# Patient Record
Sex: FEMALE | Race: WHITE | URBAN - METROPOLITAN AREA
[De-identification: names, ages, dates, MRNs, and addresses within clinical notes are randomized per-mention and may not be internally consistent; named-entity substitution may affect disease eponyms.]

---

## 2017-06-16 ENCOUNTER — OFFICE VISIT (OUTPATIENT)
Dept: FAMILY MEDICINE | Facility: CLINIC | Age: 27
End: 2017-06-16
Payer: COMMERCIAL

## 2017-06-16 ENCOUNTER — RESULT FOLLOW UP (OUTPATIENT)
Dept: FAMILY MEDICINE | Facility: CLINIC | Age: 27
End: 2017-06-16

## 2017-06-16 ENCOUNTER — TELEPHONE (OUTPATIENT)
Dept: FAMILY MEDICINE | Facility: CLINIC | Age: 27
End: 2017-06-16

## 2017-06-16 VITALS
WEIGHT: 128 LBS | BODY MASS INDEX: 17.92 KG/M2 | HEIGHT: 71 IN | DIASTOLIC BLOOD PRESSURE: 80 MMHG | TEMPERATURE: 98.8 F | SYSTOLIC BLOOD PRESSURE: 122 MMHG | RESPIRATION RATE: 16 BRPM | HEART RATE: 76 BPM

## 2017-06-16 DIAGNOSIS — A60.04 HERPES SIMPLEX VULVOVAGINITIS: ICD-10-CM

## 2017-06-16 DIAGNOSIS — N94.6 DYSMENORRHEA: ICD-10-CM

## 2017-06-16 DIAGNOSIS — R87.619 ABNORMAL CERVICAL PAPANICOLAOU SMEAR, UNSPECIFIED ABNORMAL PAP FINDING: ICD-10-CM

## 2017-06-16 DIAGNOSIS — Z12.4 SCREENING FOR MALIGNANT NEOPLASM OF CERVIX: ICD-10-CM

## 2017-06-16 DIAGNOSIS — Z00.00 ROUTINE GENERAL MEDICAL EXAMINATION AT A HEALTH CARE FACILITY: Primary | ICD-10-CM

## 2017-06-16 LAB — GLUCOSE SERPL-MCNC: 94 MG/DL (ref 70–99)

## 2017-06-16 PROCEDURE — 87491 CHLMYD TRACH DNA AMP PROBE: CPT | Performed by: FAMILY MEDICINE

## 2017-06-16 PROCEDURE — 80061 LIPID PANEL: CPT | Performed by: FAMILY MEDICINE

## 2017-06-16 PROCEDURE — 82947 ASSAY GLUCOSE BLOOD QUANT: CPT | Performed by: FAMILY MEDICINE

## 2017-06-16 PROCEDURE — 87624 HPV HI-RISK TYP POOLED RSLT: CPT | Performed by: FAMILY MEDICINE

## 2017-06-16 PROCEDURE — 88175 CYTOPATH C/V AUTO FLUID REDO: CPT | Performed by: FAMILY MEDICINE

## 2017-06-16 PROCEDURE — 99385 PREV VISIT NEW AGE 18-39: CPT | Performed by: FAMILY MEDICINE

## 2017-06-16 PROCEDURE — 87591 N.GONORRHOEAE DNA AMP PROB: CPT | Performed by: FAMILY MEDICINE

## 2017-06-16 PROCEDURE — 36415 COLL VENOUS BLD VENIPUNCTURE: CPT | Performed by: FAMILY MEDICINE

## 2017-06-16 RX ORDER — NORGESTIMATE AND ETHINYL ESTRADIOL 0.25-0.035
1 KIT ORAL DAILY
COMMUNITY
End: 2017-06-16

## 2017-06-16 RX ORDER — NORGESTIMATE AND ETHINYL ESTRADIOL 0.25-0.035
1 KIT ORAL DAILY
Qty: 84 TABLET | Refills: 3 | Status: SHIPPED | OUTPATIENT
Start: 2017-06-16 | End: 2018-05-12

## 2017-06-16 NOTE — LETTER
[facility]  [address] [place] [id number]  [phone]      June 20, 2017      Silvia Braga  [address] [facility] [address]          Dear Ms. Braga    The results of your recent lab tests were within normal limits. Enclosed is a copy of these results.  If you have any further questions or problems, please contact our office.    Sincerely,      Samina Cardenas MD/[facility]    Results for orders placed or performed in visit on 06/16/17   Lipid panel reflex to direct LDL   Result Value Ref Range    Cholesterol 162 <200 mg/dL    Triglycerides 86 <150 mg/dL    HDL Cholesterol 94 >49 mg/dL    LDL Cholesterol Calculated 51 <100 mg/dL    Non HDL Cholesterol 68 <130 mg/dL   Glucose   Result Value Ref Range    Glucose 94 70 - 99 mg/dL   NEISSERIA GONORRHOEA PCR   Result Value Ref Range    Specimen Descrip Cervix     N Gonorrhea PCR  NEG     Negative   Negative for N. gonorrhoeae rRNA by transcription mediated amplification.   A negative result by transcription mediated amplification does not preclude the   presence of N. gonorrhoeae infection because results are dependent on proper   and adequate collection, absence of inhibitors, and sufficient rRNA to be   detected.     CHLAMYDIA TRACHOMATIS PCR   Result Value Ref Range    Specimen Description Cervix     Chlamydia Trachomatis PCR  NEG     Negative   Negative for C. trachomatis rRNA by transcription mediated amplification.   A negative result by transcription mediated amplification does not preclude the   presence of C. trachomatis infection because results are dependent on proper   and adequate collection, absence of inhibitors, and sufficient rRNA to be   detected.

## 2017-06-16 NOTE — PATIENT INSTRUCTIONS
Preventive Health Recommendations  Female Ages 26 - 39  Yearly exam:   See your health care provider every year in order to    Review health changes.     Discuss preventive care.      Review your medicines if you your doctor has prescribed any.    Until age 30: Get a Pap test every three years (more often if you have had an abnormal result).    After age 30: Talk to your doctor about whether you should have a Pap test every 3 years or have a Pap test with HPV screening every 5 years.   You do not need a Pap test if your uterus was removed (hysterectomy) and you have not had cancer.  You should be tested each year for STDs (sexually transmitted diseases), if you're at risk.   Talk to your provider about how often to have your cholesterol checked.  If you are at risk for diabetes, you should have a diabetes test (fasting glucose).  Shots: Get a flu shot each year. Get a tetanus shot every 10 years.   Nutrition:     Eat at least 5 servings of fruits and vegetables each day.    Eat whole-grain bread, whole-wheat pasta and brown rice instead of white grains and rice.    Talk to your provider about Calcium and Vitamin D.     Lifestyle    Exercise at least 150 minutes a week (30 minutes a day, 5 days of the week). This will help you control your weight and prevent disease.    Limit alcohol to one drink per day.    No smoking.     Wear sunscreen to prevent skin cancer.    See your dentist every six months for an exam and cleaning.  [facility]   Discharged by : Silke Powell MA    Paper scripts provided to patient : no     If you have any questions regarding your visit please contact your care team:     [facility] Clinic Hours Telephone Number   [address] Dr[address] Dr.   7am-7pm Monday - Thursday   7am-5pm Fridays  [phone]   (Appointment scheduling available 24/7)   RN Line   [phone] option 2       For a Price  Quote for your services, please call our Consumer Price Line at [phone].     What options do I have for visits at the clinic other than the traditional office visit?     To expand how we care for you, many of our providers are utilizing electronic visits (e-visits) and telephone visits, when medically appropriate, for interactions with their patients rather than a visit in the clinic. We also offer nurse visits for many medical concerns. Just like any other service, we will bill your insurance company for this type of visit based on time spent on the phone with your provider. Not all insurance companies cover these visits. Please check with your medical insurance if this type of visit is covered. You will be responsible for any charges that are not paid by your insurance.   E-visits via [employer]: generally incur a $35.00 fee.     Telephone visits:   Time spent on the phone: *charged based on time that is spent on the phone in increments of 10 minutes. Estimated cost:   5-10 mins $30.00   11-20 mins. $59.00   21-30 mins. $85.00     Use [employer]ur[employer] (secure email communication and access to your chart) to send your primary care provider a message or make an appointment. Ask someone on your Team how to sign up for [employer].     As always, Thank you for trusting us with your health care needs!      [facility]:    Scheduling Appointments  [place]  Call: [phone]    [facility]  Call: [phone]    [facility]  Call: [phone]      WHERE TO GO FOR CARE?    Clinic    Make an appointment if you:       Are sick (cold, cough, flu, sore throat, earache or in pain).       Have a small injury (sprain, small cut, burn or broken bone).       Need a physical exam, Pap smear, vaccine or prescription refill.       Have questions about your health or medicines.    To reach us:      Call [phone] ([phone]). Open 24 hours  every day. (For counseling services, call [phone].)    Log into [employer] at [email or web address]. (Visit [email or web address] to create an account.) Hospital emergency room    An emergency is a serious or life- threatening problem that must be treated right away.    Call 911 or get to the hospital if you have:      Very bad or sudden:            - Chest pain or pressure         - Bleeding         - Head or belly pain         - Dizziness or trouble seeing, walking or                          Speaking      Problems breathing      Blood in your vomit or you are coughing up blood      A major injury (knocked out, loss of a finger or limb, rape, broken bone protruding from skin)    A mental health crisis. (Or call the Mental Health Crisis line at 1-350.407.6685 or Suicide Prevention Hotline at 1-674.788.9062.)    Open 24 hours every day. You don't need an appointment.     Urgent care    Visit urgent care for sickness or small injuries when the clinic is closed. You don't need an appointment. To check hours or find an urgent care near you, visit [email or web address]. Online care    Get online care from [facility] for more than 70 common problems, like colds, allergies and infections. Open 24 hours every day at:   [email or web address]   Need help deciding?    For advice about where to be seen, you may call your clinic and ask to speak with a nurse. We're here for you 24 hours every day.         If you are deaf or hard of hearing, please let us know. We provide many free services including sign language interpreters, oral interpreters, TTYs, telephone amplifiers, note takers and written materials.

## 2017-06-16 NOTE — PROGRESS NOTES
SUBJECTIVE:     CC: Silvia Braga is an 27 year old woman who presents for preventive health visit.     Healthy Habits:    Do you get at least three servings of calcium containing foods daily (dairy, green leafy vegetables, etc.)? yes    Amount of exercise or daily activities, outside of work: 5-7 day(s) per week    Problems taking medications regularly No    Medication side effects: No    Have you had an eye exam in the past two years? yes    Do you see a dentist twice per year? yes    Do you have sleep apnea, excessive snoring or daytime drowsiness? no          Today's PHQ-2 Score:   PHQ-2 ( 1999 Pfizer) 6/16/2017   Q1: Little interest or pleasure in doing things 0   Q2: Feeling down, depressed or hopeless 0   PHQ-2 Score 0       Abuse: Current or Past(Physical, Sexual or Emotional)- No  Do you feel safe in your environment - Yes    Social History   Substance Use Topics     Smoking status: Never Smoker     Smokeless tobacco: Never Used     Alcohol use Yes      Comment: socially     The patient does not drink >3 drinks per day nor >7 drinks per week.    No results for input(s): CHOL, HDL, LDL, TRIG, CHOLHDLRATIO, NHDL in the last 08205 hours.    Reviewed orders with patient.  Reviewed health maintenance and updated orders accordingly - Yes    Mammo Decision Support:  Mammogram not appropriate for this patient based on age.    Pertinent mammograms are reviewed under the imaging tab.  History of abnormal Pap smear: NO - age 21-29 PAP every 3 years recommended    Reviewed and updated as needed this visit by clinical staff  Tobacco  Allergies  Meds  Med Hx  Surg Hx  Fam Hx  Soc Hx        Reviewed and updated as needed this visit by Provider  Allergies  Meds  Problems  Med Hx  Surg Hx  Fam Hx            ROS:  C: NEGATIVE for fever, chills, change in weight  I: NEGATIVE for worrisome rashes, moles or lesions  E: NEGATIVE for vision changes or irritation  ENT: NEGATIVE for ear, mouth and throat problems  R:  "NEGATIVE for significant cough or SOB  B: NEGATIVE for masses, tenderness or discharge  CV: NEGATIVE for chest pain, palpitations or peripheral edema  GI: NEGATIVE for nausea, abdominal pain, heartburn, or change in bowel habits  : NEGATIVE for unusual urinary or vaginal symptoms. Periods are regular.  M: NEGATIVE for significant arthralgias or myalgia  N: NEGATIVE for weakness, dizziness or paresthesias  P: NEGATIVE for changes in mood or affect    Problem list, Medication list, Allergies, and Medical/Social/Surgical histories reviewed in [facility] and updated as appropriate.  OBJECTIVE:     /80 (BP Location: Right arm, Patient Position: Chair, Cuff Size: Adult Regular)  Pulse 76  Temp 98.8  F (37.1  C) (Oral)  Resp 16  Ht 5' 11\" (1.803 m)  Wt 128 lb (58.1 kg)  LMP 06/09/2017  BMI 17.85 kg/m2  EXAM:  GENERAL: healthy, alert and no distress  EYES: Eyes grossly normal to inspection, PERRL and conjunctivae and sclerae normal  HENT: ear canals and TM's normal, nose and mouth without ulcers or lesions  NECK: no adenopathy, no asymmetry, masses, or scars and thyroid normal to palpation  RESP: lungs clear to auscultation - no rales, rhonchi or wheezes  BREAST: normal without masses, tenderness or nipple discharge and no palpable axillary masses or adenopathy  CV: regular rate and rhythm, normal S1 S2, no S3 or S4, no murmur, click or rub, no peripheral edema and peripheral pulses strong  ABDOMEN: soft, nontender, no hepatosplenomegaly, no masses and bowel sounds normal   (female): normal female external genitalia, normal urethral meatus, vaginal mucosa pink, moist, well rugated, and normal cervix/adnexa/uterus without masses or discharge  MS: no gross musculoskeletal defects noted, no edema  SKIN: no suspicious lesions or rashes  NEURO: Normal strength and tone, mentation intact and speech normal  PSYCH: mentation appears normal, affect normal/bright    ASSESSMENT/PLAN:     1. Routine general medical " "examination at a health care facility  Advised G/c testing as she has not had testing with her most recent partner.  - NEISSERIA GONORRHOEA PCR  - CHLAMYDIA TRACHOMATIS PCR  - Lipid panel reflex to direct LDL  - Glucose    2. Dysmenorrhea  Continue current medication    - norgestimate-ethinyl estradiol (SPRINTEC 28) 0.25-35 MG-MCG per tablet; Take 1 tablet by mouth daily  Dispense: 84 tablet; Refill: 3    3. Abnormal cervical Papanicolaou smear, unspecified abnormal pap finding  Awaiting old records regarding specific details of this history.  - Pap imaged thin layer diagnostic reflex to HPV if ASCUS    4. Screening for malignant neoplasm of cervix      5. Herpes simplex vulvovaginitis  One outbreak at diagnosis.      COUNSELING:   Reviewed preventive health counseling, as reflected in patient instructions    BP Screening:   Last 3 BP Readings:    BP Readings from Last 3 Encounters:   06/16/17 122/80       The following was recommended to the patient:  Re-screen BP within a year and recommended lifestyle modifications     reports that she has never smoked. She has never used smokeless tobacco.    Estimated body mass index is 17.85 kg/(m^2) as calculated from the following:    Height as of this encounter: 5' 11\" (1.803 m).    Weight as of this encounter: 128 lb (58.1 kg).       Counseling Resources:  ATP IV Guidelines  Pooled Cohorts Equation Calculator  Breast Cancer Risk Calculator  FRAX Risk Assessment  ICSI Preventive Guidelines  Dietary Guidelines for Americans, 2010  USDA's MyPlate  ASA Prophylaxis  Lung CA Screening    Christine Montesinos MD  [facility]  "

## 2017-06-16 NOTE — TELEPHONE ENCOUNTER
Patient had physical with Dr. Montesinos 6/16/17 and brought biometric screening form to be completed.   Placed in Dr. Yamel BERNABE pending folder to await lab results.   Please mail to patient when complete. Patient needs to add waist measurement to form.     Silke Powell MA

## 2017-06-16 NOTE — NURSING NOTE
"Chief Complaint   Patient presents with     Physical     Forms     Biometric Form to be filled out       Initial /80 (BP Location: Right arm, Patient Position: Chair, Cuff Size: Adult Regular)  Pulse 76  Temp 98.8  F (37.1  C) (Oral)  Resp 16  Ht 5' 11\" (1.803 m)  Wt 128 lb (58.1 kg)  LMP 06/09/2017  BMI 17.85 kg/m2 Estimated body mass index is 17.85 kg/(m^2) as calculated from the following:    Height as of this encounter: 5' 11\" (1.803 m).    Weight as of this encounter: 128 lb (58.1 kg).  Medication Reconciliation: complete    "

## 2017-06-16 NOTE — MR AVS SNAPSHOT
After Visit Summary   6/16/2017    Silvia Braga    MRN: [id number]           Patient Information     Date Of Birth          1990        Visit Information        Provider Department      6/16/2017 10:00 AM Christine Montesinos MD [facility]        Today's Diagnoses     Routine general medical examination at a health care facility    -  1    Dysmenorrhea        Abnormal cervical Papanicolaou smear, unspecified abnormal pap finding        Screening for malignant neoplasm of cervix        Herpes simplex vulvovaginitis          Care Instructions      Preventive Health Recommendations  Female Ages 26 - 39  Yearly exam:   See your health care provider every year in order to    Review health changes.     Discuss preventive care.      Review your medicines if you your doctor has prescribed any.    Until age 30: Get a Pap test every three years (more often if you have had an abnormal result).    After age 30: Talk to your doctor about whether you should have a Pap test every 3 years or have a Pap test with HPV screening every 5 years.   You do not need a Pap test if your uterus was removed (hysterectomy) and you have not had cancer.  You should be tested each year for STDs (sexually transmitted diseases), if you're at risk.   Talk to your provider about how often to have your cholesterol checked.  If you are at risk for diabetes, you should have a diabetes test (fasting glucose).  Shots: Get a flu shot each year. Get a tetanus shot every 10 years.   Nutrition:     Eat at least 5 servings of fruits and vegetables each day.    Eat whole-grain bread, whole-wheat pasta and brown rice instead of white grains and rice.    Talk to your provider about Calcium and Vitamin D.     Lifestyle    Exercise at least 150 minutes a week (30 minutes a day, 5 days of the week). This will help you control your weight and prevent disease.    Limit alcohol to one drink per day.    No smoking.     Wear  sunscreen to prevent skin cancer.    See your dentist every six months for an exam and cleaning.  [facility]   Discharged by : Silke Powell MA    Paper scripts provided to patient : no     If you have any questions regarding your visit please contact your care team:     [facility] Clinic Hours Telephone Number   Dr. Samina Schofield PARosaC  Dr. Kingsley Felton   7am-7pm Monday - Thursday   7am-5pm Fridays  [phone]   (Appointment scheduling available 24/7)   RN Line   [phone] option 2       For a Price Quote for your services, please call our Consumer Price Line at [phone].     What options do I have for visits at the clinic other than the traditional office visit?     To expand how we care for you, many of our providers are utilizing electronic visits (e-visits) and telephone visits, when medically appropriate, for interactions with their patients rather than a visit in the clinic. We also offer nurse visits for many medical concerns. Just like any other service, we will bill your insurance company for this type of visit based on time spent on the phone with your provider. Not all insurance companies cover these visits. Please check with your medical insurance if this type of visit is covered. You will be responsible for any charges that are not paid by your insurance.   E-visits via [employer]: generally incur a $35.00 fee.     Telephone visits:   Time spent on the phone: *charged based on time that is spent on the phone in increments of 10 minutes. Estimated cost:   5-10 mins $30.00   11-20 mins. $59.00   21-30 mins. $85.00     Use Sandboxxt (secure email communication and access to your chart) to send your primary care provider a message or make an appointment. Ask someone on your Team how to sign up for [employer].     As always, Thank you for trusting us with your health care needs!      [facility]  Services:    Scheduling Appointments  [facility]ominic Liriano [facility]  Call: [phone]    [facility]  Call: [phone]    [facility]  Call: [phone]      WHERE TO GO FOR CARE?    Clinic    Make an appointment if you:       Are sick (cold, cough, flu, sore throat, earache or in pain).       Have a small injury (sprain, small cut, burn or broken bone).       Need a physical exam, Pap smear, vaccine or prescription refill.       Have questions about your health or medicines.    To reach us:      Call [phone] ([phone]). Open 24 hours every day. (For counseling services, call [phone].)    Log into [employer] at [employer]. (Visit [employer] to create an account.) Hospital emergency room    An emergency is a serious or life- threatening problem that must be treated right away.    Call 387 or get to the hospital if you have:      Very bad or sudden:            - Chest pain or pressure         - Bleeding         - Head or belly pain         - Dizziness or trouble seeing, walking or                          Speaking      Problems breathing      Blood in your vomit or you are coughing up blood      A major injury (knocked out, loss of a finger or limb, rape, broken bone protruding from skin)    A mental health crisis. (Or call the Mental Health Crisis line at 1-488.777.3616 or Suicide Prevention Hotline at 1-565.370.4032.)    Open 24 hours every day. You don't need an appointment.     Urgent care    Visit urgent care for sickness or small injuries when the clinic is closed. You don't need an appointment. To check hours or find an urgent care near you, visit [email or web address]. Online care    Get online care from [facility] for more than 70 common problems, like colds, allergies and infections. Open 24 hours every day at:   [email or web address]   Need help deciding?    For advice about where to be seen, you may call your clinic and ask to speak  "with a nurse. We're here for you 24 hours every day.         If you are deaf or hard of hearing, please let us know. We provide many free services including sign language interpreters, oral interpreters, TTYs, telephone amplifiers, note takers and written materials.                         Follow-ups after your visit        Who to contact     If you have questions or need follow up information about today's clinic visit or your schedule please contact [facility] directly at [phone].  Normal or non-critical lab and imaging results will be communicated to you by BVfon Telecommunicationhart, letter or phone within 4 business days after the clinic has received the results. If you do not hear from us within 7 days, please contact the clinic through [employer]communication[employer] or phone. If you have a critical or abnormal lab result, we will notify you by phone as soon as possible.  Submit refill requests through [employer]works or call your pharmacy and they will forward the refill request to us. Please allow 3 business days for your refill to be completed.          Additional Information About Your Visit        BV[facility] Telecommunication[facility] Hospital[facility] Imaging Information     Pharmworks lets you send messages to your doctor, view your test results, renew your prescriptions, schedule appointments and more. To sign up, go to [email or web address] . Click on \"Log in\" on the left side of the screen, which will take you to the Welcome page. Then click on \"Sign up Now\" on the right side of the page.     You will be asked to enter the access code listed below, as well as some personal information. Please follow the directions to create your username and password.     Your access code is: [email or web address]  Expires: 2017 11:02 AM     Your access code will  in 90 days. If you need help or a new code, please call your [facility] clinic or [phone].        Care EveryWhere ID     This is your Care EveryWhere ID. This could be used by other organizations to access your [facility] medical " "records  [id number]        Your Vitals Were     Pulse Temperature Respirations Height Last Period BMI (Body Mass Index)    76 98.8  F (37.1  C) (Oral) 16 5' 11\" (1.803 m) 06/09/2017 17.85 kg/m2       Blood Pressure from Last 3 Encounters:   06/16/17 122/80    Weight from Last 3 Encounters:   06/16/17 128 lb (58.1 kg)              We Performed the Following     CHLAMYDIA TRACHOMATIS PCR     Glucose     Lipid panel reflex to direct LDL     NEISSERIA GONORRHOEA PCR     Pap imaged thin layer diagnostic reflex to HPV if ASCUS          Where to get your medicines      These medications were sent to [facility] [id number] [place], [place] - [address] [facility]  [address] [facility] [address]     Phone:  [phone]     norgestimate-ethinyl estradiol 0.25-35 MG-MCG per tablet          Primary Care Provider    None Specified       No primary provider on file.        Thank you!     Thank you for choosing [facility]  for your care. Our goal is always to provide you with excellent care. Hearing back from our patients is one way we can continue to improve our services. Please take a few minutes to complete the written survey that you may receive in the mail after your visit with us. Thank you!             Your Updated Medication List - Protect others around you: Learn how to safely use, store and throw away your medicines at www.disposemymeds.org.          This list is accurate as of: 6/16/17 11:02 AM.  Always use your most recent med list.                   Brand Name Dispense Instructions for use    norgestimate-ethinyl estradiol 0.25-35 MG-MCG per tablet    SPRINTEC 28    84 tablet    Take 1 tablet by mouth daily         "

## 2017-06-16 NOTE — LETTER
July 25, 2019      Silvia Omi  [address] [facility] [address]    Dear ,      At [facility], your health and wellness is our primary concern. That is why we are following up on a colposcopy from 7/24/18. Your provider had recommended that you have a Pap smear and HPV test completed by 7/24/19. Our records do not show that this has been scheduled.    It is important to complete the follow up that your provider has suggested for you to ensure that there are no worsening changes which may, over time, develop into cancer.      Please contact our office at  [phone] to schedule an appointment for a Pap smear and HPV test at your earliest convenience. If you have questions or concerns, please call the clinic and we will be happy to assist you.    If you have completed the tests outside of [facility], please have the results forwarded to our office. We will update the chart for your primary Physician to review before your next annual physical.     Thank you for choosing [facility]!    Sincerely,      Your [facility] Care Team/nati

## 2017-06-18 LAB
C TRACH DNA SPEC QL NAA+PROBE: NORMAL
N GONORRHOEA DNA SPEC QL NAA+PROBE: NORMAL
SPECIMEN SOURCE: NORMAL
SPECIMEN SOURCE: NORMAL

## 2017-06-20 LAB
COPATH REPORT: NORMAL
PAP: NORMAL

## 2017-06-27 LAB
FINAL DIAGNOSIS: ABNORMAL
HPV HR 12 DNA CVX QL NAA+PROBE: POSITIVE
HPV16 DNA SPEC QL NAA+PROBE: NEGATIVE
HPV18 DNA SPEC QL NAA+PROBE: NEGATIVE
SPECIMEN DESCRIPTION: ABNORMAL

## 2017-06-29 ENCOUNTER — TELEPHONE (OUTPATIENT)
Dept: FAMILY MEDICINE | Facility: CLINIC | Age: 27
End: 2017-06-29

## 2017-06-29 LAB
CHOLEST SERPL-MCNC: 169 MG/DL
HDLC SERPL-MCNC: 94 MG/DL
LDLC SERPL CALC-MCNC: 56 MG/DL (ref 0–130)
NONHDLC SERPL-MCNC: 75 MG/DL
TRIGL SERPL-MCNC: 95 MG/DL

## 2017-06-29 NOTE — TELEPHONE ENCOUNTER
Relayed message. She just had a colposcopy last year & is wondering if it is normal to have a another colp that soon. She understands it is due to carrying a higher risk type & will await a call after records are received & PCP returns.    Mary Crockett RN

## 2017-06-29 NOTE — TELEPHONE ENCOUNTER
Routing to PCP Patient would like you to call re her abnormal pap and the need for repeat colposcopy.[phone]   Shirley Wall,Clinic Rn  [facility] [place]

## 2017-06-29 NOTE — TELEPHONE ENCOUNTER
Please let this patient know that I am out of the office until July 14th (I am in meetings the rest of today).  So I will certainly be happy to talk with her at that time.  I am also still waiting for her records to know what her last pap results are, which will give me the best information to advise her.  Essentially, she has a normal pap - but she does carry HPV - and because she carries a higher risk type - we suggest colposcopy.    However, she should be reassured that her Pap is normal.

## 2017-06-29 NOTE — TELEPHONE ENCOUNTER
Reason for Call:  Request for results:    Name of test or procedure: labs done 6/16    Date of test of procedure: 6/16    Location of the test or procedure: [place]    OK to leave the result message on voice mail or with a family member? YES    Phone number Patient can be reached at:  Home number on file [phone] (home)    Additional comments: none    Call taken on 6/29/2017 at 8:27 AM by Kathryn Amaya

## 2017-07-03 ENCOUNTER — TELEPHONE (OUTPATIENT)
Dept: FAMILY MEDICINE | Facility: CLINIC | Age: 27
End: 2017-07-03

## 2017-07-03 DIAGNOSIS — A60.04 HERPES SIMPLEX VULVOVAGINITIS: Primary | ICD-10-CM

## 2017-07-03 RX ORDER — VALACYCLOVIR HYDROCHLORIDE 500 MG/1
500 TABLET, FILM COATED ORAL 2 TIMES DAILY
Qty: 6 TABLET | Refills: 3 | Status: SHIPPED | OUTPATIENT
Start: 2017-07-03 | End: 2018-03-10

## 2017-07-03 NOTE — TELEPHONE ENCOUNTER
Routing refill request to provider for review/approval because:  Drug not active on patient's medication list. See message below.     Patricia Mcgill RN

## 2017-07-03 NOTE — TELEPHONE ENCOUNTER
"Called and spoke to  Soheila. She states that it was one pill twice daily for \"a few days at a time.\" States it was for genital herpres outbreaks, not oral cold sores. RN pended medication.     Patricia Mcgill RN    "

## 2017-07-03 NOTE — PROGRESS NOTES
Records show: 10/11/13 LSIL pap @ 22 yo. - had colposcopy  10/17/14 ASCUS pap, HR HPV pos  11/09/15 NIL pap, HP HPV pos - has colposcopy (no biopsies)  11/30/16 ASCUS pap, HR HPV pos @ 27 yo. Plan: colp  Patient reports colposcopy in 2016 (but I do not have those results)  6/16/17 NIL pap/+ HR HPV (not 16 or 18). Plan: colp bef 9/16/17 6/29/17 Pt. Advised by Dr. Montesinos staff.  7/3/17 Pt. Advised.  7/28/17 [place]: normal. Plan: cotest in 1 year.  6/18/18 NIL pap, + HR HPV (not 16 or 18). Plan: colp bef 9/18/18 6/22/18 pt notified by phone.  07/24/18: [place] Bx and Ecc benign, neg for dysplasia. Plan cotest in 1 year. (sk) Pt was advised. (sk)  7/17/19 My Chart cotest reminder message sent (rlm)  7/25/19 My Chart not read, reminder letter sent (rlm)  8/27/19 Pap Follow up reminder call placed, voicemail left (rlm)  10/21/19 Patient is lost to pap tracking follow-up. FYI routed to provider. (kee)

## 2017-07-03 NOTE — TELEPHONE ENCOUNTER
Reason for Call:  Medication or medication refill:    Do you use a [facility]?  Name of the pharmacy and phone number for the current request:  [facility] [address]/ in chart    Name of the medication requested: Valtrex (prescribed by her previous PCP just moved to area) patient uses for outbreaks of cold sores/ patient had requested her prior medical records from clinic she used to go to but we do not have them yet./ please call patient and let her know whether we can prescribe or not    Other request:     Can we leave a detailed message on this number? YES    Phone number patient can be reached at: Home number on file [phone] (home)    Best Time: any    Call taken on 7/3/2017 at 9:15 AM by Cady Pedersen

## 2017-07-13 NOTE — TELEPHONE ENCOUNTER
Is it possible for you to look at see in my folder if we have received her old records yet?  If not, I will just call her anyway and talk about options.

## 2017-07-18 NOTE — TELEPHONE ENCOUNTER
Attempted to contact patient again.  No answer.  Will forward back to the team in case she does call back, I will just need to know the best time and way to reach her.

## 2017-07-19 NOTE — TELEPHONE ENCOUNTER
Patient returned missed call. Please call back, okay to leave message.    Soheila Catalan, Patient Representative

## 2017-07-19 NOTE — TELEPHONE ENCOUNTER
Patient called back and wanted to schedule an appointment w/ Dr Montesinos.    I scheduled patient.    Cady Pedersen

## 2017-07-19 NOTE — TELEPHONE ENCOUNTER
"Called patient and was not able to leave a message because phone said \"mailbox full\".    Called Emergency contact, mother, Clari and asked if there was a better way to reach patient.  Mother gave me patient work phone number, [phone].  I tried calling that phone number and it went to [facility]'s voicemail but I did not leave a message.    Cady Pedersen      "

## 2017-07-25 ENCOUNTER — TELEPHONE (OUTPATIENT)
Dept: FAMILY MEDICINE | Facility: CLINIC | Age: 27
End: 2017-07-25

## 2017-07-25 NOTE — LETTER
[facility]  [address] [place] [id number] [phone]                                                                                                July 25, 2017    Silvia Braga  [address] [facility] [address]        Dear Ms. Braga    The results of your recent lab tests were within normal limits. Enclosed is a copy of these results.  If you have any further questions or problems, please contact our office.    Sincerely,      Samina Cardenas MD/[facility]    Results for orders placed or performed in visit on 06/16/17   Lipid panel reflex to direct LDL   Result Value Ref Range    Cholesterol 162 <200 mg/dL    Triglycerides 86 <150 mg/dL    HDL Cholesterol 94 >49 mg/dL    LDL Cholesterol Calculated 51 <100 mg/dL    Non HDL Cholesterol 68 <130 mg/dL   Glucose   Result Value Ref Range    Glucose 94 70 - 99 mg/dL   NEISSERIA GONORRHOEA PCR   Result Value Ref Range    Specimen Descrip Cervix     N Gonorrhea PCR  NEG     Negative   Negative for N. gonorrhoeae rRNA by transcription mediated amplification.   A negative result by transcription mediated amplification does not preclude the   presence of N. gonorrhoeae infection because results are dependent on proper   and adequate collection, absence of inhibitors, and sufficient rRNA to be   detected.     CHLAMYDIA TRACHOMATIS PCR   Result Value Ref Range    Specimen Description Cervix     Chlamydia Trachomatis PCR  NEG     Negative   Negative for C. trachomatis rRNA by transcription mediated amplification.   A negative result by transcription mediated amplification does not preclude the   presence of C. trachomatis infection because results are dependent on proper   and adequate collection, absence of inhibitors, and sufficient rRNA to be   detected.

## 2017-07-25 NOTE — TELEPHONE ENCOUNTER
Reason for Call:  Other     Detailed comments: Patient moved and is asking if you can mail out the result letter that was mailed on 06-, that went to the wrong address. Her new address is updated now.  [address] [place] [address]                 Phone Number Patient can be reached at: Home number on file [phone] (home)    Best Time: anytime    Can we leave a detailed message on this number? YES    Call taken on 7/25/2017 at 12:23 PM by Leonor Trimble

## 2017-07-28 ENCOUNTER — OFFICE VISIT (OUTPATIENT)
Dept: FAMILY MEDICINE | Facility: CLINIC | Age: 27
End: 2017-07-28
Payer: COMMERCIAL

## 2017-07-28 VITALS
TEMPERATURE: 98.4 F | BODY MASS INDEX: 18.48 KG/M2 | SYSTOLIC BLOOD PRESSURE: 130 MMHG | DIASTOLIC BLOOD PRESSURE: 82 MMHG | WEIGHT: 132 LBS | HEART RATE: 92 BPM | HEIGHT: 71 IN

## 2017-07-28 DIAGNOSIS — R87.619 ABNORMAL CERVICAL PAPANICOLAOU SMEAR, UNSPECIFIED ABNORMAL PAP FINDING: Primary | ICD-10-CM

## 2017-07-28 LAB — BETA HCG QUAL IFA URINE: NEGATIVE

## 2017-07-28 PROCEDURE — 57456 ENDOCERV CURETTAGE W/SCOPE: CPT | Performed by: FAMILY MEDICINE

## 2017-07-28 PROCEDURE — 84703 CHORIONIC GONADOTROPIN ASSAY: CPT | Performed by: FAMILY MEDICINE

## 2017-07-28 PROCEDURE — 88305 TISSUE EXAM BY PATHOLOGIST: CPT | Performed by: FAMILY MEDICINE

## 2017-07-28 NOTE — PROGRESS NOTES
Silvia Braga is a 27 year old female who presents for repeat colposcopy, referred by Dr. Cardenas. Pap smear 1 months ago showed: 6/16/17 NIL pap/+ HR HPV (not 16 or 18). The prior pap showed Patient reports colposcopy in 2016  -- awaiting records  2016 colp done. Plan: cotest      Past Medical History:   Diagnosis Date     Abnormal cervical Papanicolaou smear, unspecified abnormal pap finding 6/16/2017    Patient reports colposcopy in 2016  -- awaiting records     Dysmenorrhea 6/16/2017     Herpes simplex vulvovaginitis 6/16/2017    One outbreak at diagnosis in 2015     Family History   Problem Relation Age of Onset     Hypertension Mother      Hypertension Father        Previous history of abnormal paps?: Yes   History of cryotherapy (freezing)?: : No  History of veneral diseases: : No  Do you desire testing for any of these diseases? : No  History of genital warts:  Yes   Visible warts now?:  No  Previously treated? If so, how?:  Yes valtrex     Patient's last menstrual period was 07/04/2017 (approximate).    Type of contraception: oral contraceptive  Age at first sexual intercourse: 17  Number of sexual partners (lifetime): no more than 10  History   Smoking Status     Never Smoker   Smokeless Tobacco     Never Used       History of sexual abuse:  No    Allergies as of 07/28/2017     (No Known Allergies)       PROCEDURE:  Before the procedure, it was ensured that the patient was educated regarding the nature of her findings to date, the implications of them, and what was to be done. She has been made aware of the role of HPV, the natural history of infection, ways to minimize her future risk, the effect of HPV on the cervix, and treatment options available should they be indicated. The   pathophysiology of the cervix, including a discussion of squamous vs. endometrial cells, and squamous metaplasia have all been reviewed, using illustrations and sketches. The details of the colposcopic procedure were reviewed,  as well as the risks of missed diagnoses, pain, infection and bleeding. All questions were answered before proceeding, and informed consent was therefore obtained.    Bimanual examination: was not done  Unenhanced examination of the cervix was normal without lesions.    Please refer to images section for details!  Pap repeated?:  No  SCJ seen?:  yes  Endocervical speculum needed?:  No  ECC done?:  Yes   Lugol's solution used?:  Yes   Satisfactory examination?:  yes    Vaginal vault: normal to cursory inspection yes  Urethra normal?:  yes  Labia normal?:  yes  Perineum normal?:  yes  Rectum normal?:  yes    FINDINGS:  Please see image   Cervix: no visible lesions  Procedure: biopsies taken (not including ECC): 0.    Procedure summary: Patient tolerated procedure well     Assessment: Normal exam without visible pathology    Plan: Specimens labelled and sent to pathology., Will base further treatment on pathology findings. and treatment options discussed with patient

## 2017-07-28 NOTE — MR AVS SNAPSHOT
"              After Visit Summary   2017    Silvia Braga    MRN: [id number]           Patient Information     Date Of Birth          1990        Visit Information        Provider Department      2017 1:40 PM Christine Montesinos MD; [facility] PROC RM OB/COLP [facility]        Today's Diagnoses     Abnormal cervical Papanicolaou smear, unspecified abnormal pap finding    -  1       Follow-ups after your visit        Who to contact     If you have questions or need follow up information about today's clinic visit or your schedule please contact [facility] directly at [phone].  Normal or non-critical lab and imaging results will be communicated to you by MyChart, letter or phone within 4 business days after the clinic has received the results. If you do not hear from us within 7 days, please contact the clinic through [email or web address] or phone. If you have a critical or abnormal lab result, we will notify you by phone as soon as possible.  Submit refill requests through GoPlaceIt or call your pharmacy and they will forward the refill request to us. Please allow 3 business days for your refill to be completed.          Additional Information About Your Visit        MyChart Information     GoPlaceIt lets you send messages to your doctor, view your test results, renew your prescriptions, schedule appointments and more. To sign up, go to [email or web address] . Click on \"Log in\" on the left side of the screen, which will take you to the Welcome page. Then click on \"Sign up Now\" on the right side of the page.     You will be asked to enter the access code listed below, as well as some personal information. Please follow the directions to create your username and password.     Your access code is: [email or web address]  Expires: 2017 11:02 AM     Your access code will  in 90 days. If you need help or a new code, please call your [facility] or [phone].        Care " "EveryWhere ID     This is your Care EveryWhere ID. This could be used by other organizations to access your [facility] medical records  [id number]        Your Vitals Were     Pulse Temperature Height Last Period BMI (Body Mass Index)       92 98.4  F (36.9  C) (Oral) 5' 11\" (1.803 m) 07/04/2017 (Approximate) 18.41 kg/m2        Blood Pressure from Last 3 Encounters:   07/28/17 130/82   06/16/17 122/80    Weight from Last 3 Encounters:   07/28/17 132 lb (59.9 kg)   06/16/17 128 lb (58.1 kg)              We Performed the Following     Beta HCG qual IFA urine     COLP CERVIX/UPPER VAGINA W BX CERVIX     Surgical pathology exam          Today's Medication Changes          These changes are accurate as of: 7/28/17  3:36 PM.  If you have any questions, ask your nurse or doctor.               These medicines have changed or have updated prescriptions.        Dose/Directions    valACYclovir 500 MG tablet   Commonly known as:  VALTREX   This may have changed:    - when to take this  - reasons to take this   Used for:  Herpes simplex vulvovaginitis        Dose:  500 mg   Take 1 tablet (500 mg) by mouth 2 times daily   Quantity:  6 tablet   Refills:  3                Primary Care Provider Office Phone # Fax #    Christine Rianna Montesinos [phone]       [address] [address]        Equal Access to Services     PAIGE JUÁREZ AH: Med Schwab, waaxda luqklarissa, qaybta kaalmada michelle, desiree adorno. So [facility] [phone].    ATENCIÓN: Si habla español, tiene a moon disposición servicios gratuitos de asistencia lingüística. Charli florence [phone].    We comply with applicable federal civil rights laws and [place] laws. We do not discriminate on the basis of race, color, national origin, age, disability sex, sexual orientation or gender identity.            Thank you!     Thank you for choosing [facility]  for " your care. Our goal is always to provide you with excellent care. Hearing back from our patients is one way we can continue to improve our services. Please take a few minutes to complete the written survey that you may receive in the mail after your visit with us. Thank you!             Your Updated Medication List - Protect others around you: Learn how to safely use, store and throw away your medicines at www.disposemymeds.org.          This list is accurate as of: 7/28/17  3:36 PM.  Always use your most recent med list.                   Brand Name Dispense Instructions for use Diagnosis    norgestimate-ethinyl estradiol 0.25-35 MG-MCG per tablet    SPRINTEC 28    84 tablet    Take 1 tablet by mouth daily    Dysmenorrhea       valACYclovir 500 MG tablet    VALTREX    6 tablet    Take 1 tablet (500 mg) by mouth 2 times daily    Herpes simplex vulvovaginitis

## 2017-07-28 NOTE — NURSING NOTE
"Chief Complaint   Patient presents with     Colposcopy       Initial /82 (Cuff Size: Adult Regular)  Pulse 92  Temp 98.4  F (36.9  C) (Oral)  Ht 5' 11\" (1.803 m)  Wt 132 lb (59.9 kg)  LMP 07/04/2017 (Approximate)  BMI 18.41 kg/m2 Estimated body mass index is 18.41 kg/(m^2) as calculated from the following:    Height as of this encounter: 5' 11\" (1.803 m).    Weight as of this encounter: 132 lb (59.9 kg).  Medication Reconciliation: complete   aCndy Reynaga, Certified Medical Assistant (AAMA)     "

## 2017-08-03 LAB — COPATH REPORT: NORMAL

## 2017-08-06 ENCOUNTER — TELEPHONE (OUTPATIENT)
Dept: FAMILY MEDICINE | Facility: CLINIC | Age: 27
End: 2017-08-06

## 2017-08-07 NOTE — TELEPHONE ENCOUNTER
Patient notified of colp results. All questions answered.   GIANNI ShaverN, RN, Pap Tracking Nurse

## 2017-08-07 NOTE — TELEPHONE ENCOUNTER
Left message for patient to return my call to [phone].    Dotty Kerr, GIANNIN, RN  Pap Tracking Nurse

## 2017-08-25 ENCOUNTER — DOCUMENTATION ONLY (OUTPATIENT)
Dept: FAMILY MEDICINE | Facility: CLINIC | Age: 27
End: 2017-08-25

## 2017-08-25 NOTE — PROGRESS NOTES
Records received from [facility].  Routed to Samina Cardenas MD to review and return to be scanned into chart.      .Traci Oakley  Patient Representative

## 2018-03-10 DIAGNOSIS — A60.04 HERPES SIMPLEX VULVOVAGINITIS: ICD-10-CM

## 2018-03-12 NOTE — TELEPHONE ENCOUNTER
"Requested Prescriptions   Pending Prescriptions Disp Refills     valACYclovir (VALTREX) 500 MG tablet [Pharmacy Med Name: VALACYCLOVIR  MG TABLET]  Last Written Prescription Date:  7/3/2017  Last Fill Quantity: 6 tablet,  # refills: 3   Last office visit: 7/28/2017 with prescribing provider:  CATRINA Montesinos   Future Office Visit:     6 tablet 3     Sig: TAKE 1 TABLET BY MOUTH TWICE A DAY    Antivirals for Herpes Protocol Failed    3/10/2018  4:13 PM       Failed - Normal serum creatinine on file in past 12 months    Recent Labs   Lab Test 08/13/14   CR  1.01            Passed - Patient is age 12 or older       Passed - Recent (12 mo) or future (30 days) visit within the authorizing provider's specialty    Patient had office visit in the last 12 months or has a visit in the next 30 days with authorizing provider or within the authorizing provider's specialty.  See \"Patient Info\" tab in inbasket, or \"Choose Columns\" in Meds & Orders section of the refill encounter.              "

## 2018-03-12 NOTE — TELEPHONE ENCOUNTER
Routing refill request to provider for review/approval because:  Labs not current:  See below.    Isaac Macias RN

## 2018-03-13 RX ORDER — VALACYCLOVIR HYDROCHLORIDE 500 MG/1
TABLET, FILM COATED ORAL
Qty: 6 TABLET | Refills: 3 | Status: SHIPPED | OUTPATIENT
Start: 2018-03-13 | End: 2018-04-27

## 2018-04-09 ENCOUNTER — TELEPHONE (OUTPATIENT)
Dept: FAMILY MEDICINE | Facility: CLINIC | Age: 28
End: 2018-04-09

## 2018-04-09 NOTE — TELEPHONE ENCOUNTER
Called patient- she is wondering about a daily valtrex medication since she has had outbreaks about once a month this year. The outbreaks are not bad, but it's uncomfortable. She is willing to do a phone or e-visit if you'd prefer.  Mary Crockett RN

## 2018-04-09 NOTE — TELEPHONE ENCOUNTER
Message received from PCP saying we could squeeze patient in for a phone visit. Informed about cost & protocol & scheduled 4/11. Patient verbalized understanding.    Mary Crockett RN

## 2018-04-09 NOTE — TELEPHONE ENCOUNTER
Since this is a change in treatment plan, I would recommend a visit.  Phone or e-visit is fine (although I imagine with the e-visit there will need to be some back and forth dialogue, so advise her to be prepared for that).

## 2018-04-09 NOTE — TELEPHONE ENCOUNTER
Reason for Call:  Medication or medication refill:    Do you use a [facility]?  Name of the pharmacy and phone number for the current request[id number] [place], [place] - [address]    Name of the medication requested: valACYclovir (VALTREX) 500 MG tablet    Other request: Patient states she would like to talk to a nurse regarding this prescription    Can we leave a detailed message on this number? YES    Phone number patient can be reached at: Home number on file [phone] (home)    Best Time: Any time    Thank you!  Serena KEARNS  Patient Representative  [facility]      Call taken on 4/9/2018 at 7:52 AM by Serena Salcedo

## 2018-04-09 NOTE — TELEPHONE ENCOUNTER
Informed patient & gave her the MyChart number & she will initiate an e-visit since PCP is booked out.  Mary Crockett RN

## 2018-04-11 ENCOUNTER — VIRTUAL VISIT (OUTPATIENT)
Dept: FAMILY MEDICINE | Facility: CLINIC | Age: 28
End: 2018-04-11
Payer: COMMERCIAL

## 2018-04-11 DIAGNOSIS — A60.04 HERPES SIMPLEX VULVOVAGINITIS: Primary | ICD-10-CM

## 2018-04-11 PROCEDURE — 99441 ZZC PHYSICIAN TELEPHONE EVALUATION 5-10 MIN: CPT | Performed by: FAMILY MEDICINE

## 2018-04-11 RX ORDER — VALACYCLOVIR HYDROCHLORIDE 500 MG/1
500 TABLET, FILM COATED ORAL DAILY
Qty: 90 TABLET | Refills: 1 | Status: SHIPPED | OUTPATIENT
Start: 2018-04-11 | End: 2018-10-10

## 2018-04-11 NOTE — MR AVS SNAPSHOT
"              After Visit Summary   2018    Silvia Braga    MRN: [id number]           Patient Information     Date Of Birth          1990        Visit Information        Provider Department      2018 1:40 PM Christine Montesinos MD [facility]        Today's Diagnoses     Herpes simplex vulvovaginitis    -  1       Follow-ups after your visit        Who to contact     If you have questions or need follow up information about today's clinic visit or your schedule please contact [facility] directly at [phone].  Normal or non-critical lab and imaging results will be communicated to you by Sanohart, letter or phone within 4 business days after the clinic has received the results. If you do not hear from us within 7 days, please contact the clinic through Sanohart or phone. If you have a critical or abnormal lab result, we will notify you by phone as soon as possible.  Submit refill requests through Vyykn or call your pharmacy and they will forward the refill request to us. Please allow 3 business days for your refill to be completed.          Additional Information About Your Visit        MyChart Information     Vyykn lets you send messages to your doctor, view your test results, renew your prescriptions, schedule appointments and more. To sign up, go to [email or web address] . Click on \"Log in\" on the left side of the screen, which will take you to the Welcome page. Then click on \"Sign up Now\" on the right side of the page.     You will be asked to enter the access code listed below, as well as some personal information. Please follow the directions to create your username and password.     Your access code is: [email or web address]  Expires: 7/10/2018  3:03 PM     Your access code will  in 90 days. If you need help or a new code, please call your [facility] or [phone].        Care EveryWhere ID     This is your Care EveryWhere ID. This could be " used by other organizations to access your [facility] medical records  [id number]         Blood Pressure from Last 3 Encounters:   07/28/17 130/82   06/16/17 122/80    Weight from Last 3 Encounters:   07/28/17 132 lb (59.9 kg)   06/16/17 128 lb (58.1 kg)              Today, you had the following     No orders found for display         Today's Medication Changes          These changes are accurate as of 4/11/18  3:03 PM.  If you have any questions, ask your nurse or doctor.               These medicines have changed or have updated prescriptions.        Dose/Directions    * valACYclovir 500 MG tablet   Commonly known as:  VALTREX   This may have changed:  Another medication with the same name was added. Make sure you understand how and when to take each.   Used for:  Herpes simplex vulvovaginitis        TAKE 1 TABLET BY MOUTH TWICE A DAY   Quantity:  6 tablet   Refills:  3       * valACYclovir 500 MG tablet   Commonly known as:  VALTREX   This may have changed:  You were already taking a medication with the same name, and this prescription was added. Make sure you understand how and when to take each.   Used for:  Herpes simplex vulvovaginitis        Dose:  500 mg   Take 1 tablet (500 mg) by mouth daily   Quantity:  90 tablet   Refills:  1       * Notice:  This list has 2 medication(s) that are the same as other medications prescribed for you. Read the directions carefully, and ask your doctor or other care provider to review them with you.         Where to get your medicines      These medications were sent to [place] [address] [place] [facility] [address]  [address], [place] [address]     Phone:  [phone]     valACYclovir 500 MG tablet                Primary Care Provider Office Phone # Fax #    Christine Montesinos [phone]       [facility] [address]        Equal Access to Services     PAIGE JUÁREZ AH: ariela Bryan  shakira osmanmamatthew garciaeitanmatthewdesiree carmenin hayaan adeeg kharash la'aan ah. So [facility] [phone].    ATENCIÓN: Si jackson briggs, tiene a moon disposición servicios gratuitos de asistencia lingüística. Charli al [phone].    We comply with applicable federal civil rights laws and [place] laws. We do not discriminate on the basis of race, color, national origin, age, disability, sex, sexual orientation, or gender identity.            Thank you!     Thank you for choosing [facility]  for your care. Our goal is always to provide you with excellent care. Hearing back from our patients is one way we can continue to improve our services. Please take a few minutes to complete the written survey that you may receive in the mail after your visit with us. Thank you!             Your Updated Medication List - Protect others around you: Learn how to safely use, store and throw away your medicines at www.disposemymeds.org.          This list is accurate as of 4/11/18  3:03 PM.  Always use your most recent med list.                   Brand Name Dispense Instructions for use Diagnosis    norgestimate-ethinyl estradiol 0.25-35 MG-MCG per tablet    SPRINTEC 28    84 tablet    Take 1 tablet by mouth daily    Dysmenorrhea       * valACYclovir 500 MG tablet    VALTREX    6 tablet    TAKE 1 TABLET BY MOUTH TWICE A DAY    Herpes simplex vulvovaginitis       * valACYclovir 500 MG tablet    VALTREX    90 tablet    Take 1 tablet (500 mg) by mouth daily    Herpes simplex vulvovaginitis       * Notice:  This list has 2 medication(s) that are the same as other medications prescribed for you. Read the directions carefully, and ask your doctor or other care provider to review them with you.

## 2018-04-11 NOTE — PROGRESS NOTES
Patient opted to conduct today's return visit via telephone vs an in person visit to the clinic.    I spoke with: Milton    The reason for the telephone visit was: herpes, more frequent outbreaks    Pertinent history and review of systems:   First herpes outbreak was about four years ago.  Rarely had outbreaks previously - and short course of valtrex works.  This year has had more outbreaks than usual.  Has had outbreaks once a month for past three months - lasts only two days with medication    Assessment: monthly herpes outbreaks for the past three months, painful although well controlled with medication    Advice/instructions given to patient/guardian including prescriptions, follow up appointment or orders for diagnostic testing:   Desires to try daily suppression.  We agreed to do this for a 6 month time period.  I discussed with the patient risks and benefits of the new medications prescribed including potential side effects.  The patient had opportunity to ask questions and is comfortable with and interested in medications as prescribed.    Although she will evaluate cost when she goes to her pharmacy to make sure she can afford daily therapy.  We will monitor kidney function at her Preventive Visit in 2-3 months while she is on daily suppressive therapy.    Phone call contact time    Call Started at: 2:54 PM    Call Ended at: 3:00 PM

## 2018-04-26 ENCOUNTER — TELEPHONE (OUTPATIENT)
Dept: FAMILY MEDICINE | Facility: CLINIC | Age: 28
End: 2018-04-26

## 2018-04-26 NOTE — TELEPHONE ENCOUNTER
Silvia Braga is a 27 year old female who calls with dizziness.    NURSING ASSESSMENT:  Description:  Dizzy spell x 2 - occurred when laying down. During day otherwise feeling constant pressure in her head. Not dizzy when walking. Not lightheadedness. Worse when looking at computer screen. Headache and ears ache at times. No vision changes. New contact prescription months ago and she doesn't think it would be related. Played volleyball last night and had to stop due to dizziness/nausea. Denies possibility of pregnancy.  Onset/duration:  2 weeks ago - sudden onset.  Precip. factors:  Nothing in particular  Associated symptoms:  As above. Denies fever, chest pain, weakness, confusion, irregular/fast heart rhythm, severe headache, vision changes  Improves/worsens symptoms:  As above    MEDICATIONS:   Taking medication(s) as prescribed? N/A  Taking over the counter medication(s?) No  Any medication side effects? Not Applicable    Any barriers to taking medication(s) as prescribed?  N/A  Medication(s) improving/managing symptoms?  N/A  Medication reconciliation completed: Yes      NURSING PLAN: Nursing advice to patient office visit within 24 hours. Scheduled.    RECOMMENDED DISPOSITION:  See in 24 hours - scheduled  Will comply with recommendation: Yes  If further questions/concerns or if symptoms do not improve, worsen or new symptoms develop, call your PCP or [facility] as soon as possible.      Guideline used:  Telephone Triage Protocols for Nurses, Fifth Edition, Shelley Gold RN

## 2018-04-26 NOTE — TELEPHONE ENCOUNTER
Reason for call:  Patient reporting a symptom    Symptom or request: Dizzy, ear aches    Duration (how long have symptoms been present): 2 weeks    Have you been treated for this before? No    Additional comments: Patient stated she has been dizzy for about 2 weeks and would like to discuss her symptoms and if she should be seen.     Phone Number patient can be reached at:  Home number on file [phone] (home)    Best Time:  Anytime    Can we leave a detailed message on this number:  YES    Call taken on 4/26/2018 at 7:21 AM by Silke Martinez

## 2018-04-26 NOTE — TELEPHONE ENCOUNTER
Patient/family was instructed to return call to [facility] RN directly on the RN Call back line at [phone].     Delfin Gold RN

## 2018-04-27 ENCOUNTER — OFFICE VISIT (OUTPATIENT)
Dept: FAMILY MEDICINE | Facility: CLINIC | Age: 28
End: 2018-04-27
Payer: COMMERCIAL

## 2018-04-27 VITALS
SYSTOLIC BLOOD PRESSURE: 128 MMHG | TEMPERATURE: 98.1 F | WEIGHT: 140 LBS | HEIGHT: 71 IN | HEART RATE: 76 BPM | BODY MASS INDEX: 19.6 KG/M2 | DIASTOLIC BLOOD PRESSURE: 78 MMHG

## 2018-04-27 DIAGNOSIS — R11.0 NAUSEA: ICD-10-CM

## 2018-04-27 DIAGNOSIS — R51.9 HEADACHE IN FRONT OF HEAD: ICD-10-CM

## 2018-04-27 DIAGNOSIS — R68.89 LIGHT SENSITIVITY: ICD-10-CM

## 2018-04-27 DIAGNOSIS — R42 DIZZINESS: Primary | ICD-10-CM

## 2018-04-27 LAB
BASOPHILS # BLD AUTO: 0 10E9/L (ref 0–0.2)
BASOPHILS NFR BLD AUTO: 0.1 %
BETA HCG QUAL IFA URINE: NEGATIVE
DIFFERENTIAL METHOD BLD: NORMAL
EOSINOPHIL # BLD AUTO: 0.1 10E9/L (ref 0–0.7)
EOSINOPHIL NFR BLD AUTO: 1.2 %
ERYTHROCYTE [DISTWIDTH] IN BLOOD BY AUTOMATED COUNT: 12.5 % (ref 10–15)
HCT VFR BLD AUTO: 36.4 % (ref 35–47)
HGB BLD-MCNC: 12 G/DL (ref 11.7–15.7)
LYMPHOCYTES # BLD AUTO: 2 10E9/L (ref 0.8–5.3)
LYMPHOCYTES NFR BLD AUTO: 24 %
MCH RBC QN AUTO: 30.8 PG (ref 26.5–33)
MCHC RBC AUTO-ENTMCNC: 33 G/DL (ref 31.5–36.5)
MCV RBC AUTO: 94 FL (ref 78–100)
MONOCYTES # BLD AUTO: 0.8 10E9/L (ref 0–1.3)
MONOCYTES NFR BLD AUTO: 9.5 %
NEUTROPHILS # BLD AUTO: 5.3 10E9/L (ref 1.6–8.3)
NEUTROPHILS NFR BLD AUTO: 65.2 %
PLATELET # BLD AUTO: 229 10E9/L (ref 150–450)
RBC # BLD AUTO: 3.89 10E12/L (ref 3.8–5.2)
WBC # BLD AUTO: 8.1 10E9/L (ref 4–11)

## 2018-04-27 PROCEDURE — 80048 BASIC METABOLIC PNL TOTAL CA: CPT | Performed by: FAMILY MEDICINE

## 2018-04-27 PROCEDURE — 85025 COMPLETE CBC W/AUTO DIFF WBC: CPT | Performed by: FAMILY MEDICINE

## 2018-04-27 PROCEDURE — 84703 CHORIONIC GONADOTROPIN ASSAY: CPT | Performed by: FAMILY MEDICINE

## 2018-04-27 PROCEDURE — 84443 ASSAY THYROID STIM HORMONE: CPT | Performed by: FAMILY MEDICINE

## 2018-04-27 PROCEDURE — 99214 OFFICE O/P EST MOD 30 MIN: CPT | Performed by: FAMILY MEDICINE

## 2018-04-27 PROCEDURE — 36415 COLL VENOUS BLD VENIPUNCTURE: CPT | Performed by: FAMILY MEDICINE

## 2018-04-27 RX ORDER — MECLIZINE HYDROCHLORIDE 25 MG/1
25 TABLET ORAL EVERY 6 HOURS PRN
Qty: 15 TABLET | Refills: 0 | Status: SHIPPED | OUTPATIENT
Start: 2018-04-27 | End: 2018-06-27

## 2018-04-27 RX ORDER — SUMATRIPTAN 25 MG/1
25-50 TABLET, FILM COATED ORAL
Qty: 6 TABLET | Refills: 0 | Status: SHIPPED | OUTPATIENT
Start: 2018-04-27 | End: 2018-06-27

## 2018-04-27 NOTE — PATIENT INSTRUCTIONS
initial labs are negative  Use imitrex for headaches and meclizine as needed for dizziness  Follow up in 3-4 days with provider if not improving  Labs and urine test today. I will notify you of results when I receive them.     [facility]   Discharged by : Bibiana RAMÍREZ MA    If you have any questions regarding your visit please contact your care team:     Team Gold                Clinic Hours Telephone Number     Dr. Samina Marin NP 7am-7pm  Monday - Thursday   7am-5pm  Fridays  [phone]   (Appointment scheduling available 24/7)     RN Line  [phone] option 2     Urgent Care - [place] - 11am-9pm Monday-Friday Saturday-Sunday- 9am-5pm     [place] -   5pm-9pm Monday-Friday Saturday-Sunday- 9am-5pm    [phone] - [place]    [phone] - [place]       For a Price Quote for your services, please call our Consumer Price Line at [phone].     What options do I have for visits at the clinic other than the traditional office visit?     To expand how we care for you, many of our providers are utilizing electronic visits (e-visits) and telephone visits, when medically appropriate, for interactions with their patients rather than a visit in the clinic. We also offer nurse visits for many medical concerns. Just like any other service, we will bill your insurance company for this type of visit based on time spent on the phone with your provider. Not all insurance companies cover these visits. Please check with your medical insurance if this type of visit is covered. You will be responsible for any charges that are not paid by your insurance.   E-visits via BlueVine: generally incur a $35.00 fee.     Telephone visits:  Time spent on the phone: *charged based on time that is spent on the phone in increments of 10 minutes. Estimated cost:   5-10 mins $30.00   11-20 mins. $59.00    21-30 mins. $85.00       Use [email or web address] (secure email communication and access to your chart) to send your primary care provider a message or make an appointment. Ask someone on your Team how to sign up for [email or web address].     As always, Thank you for trusting us with your health care needs!      [facility]:    Scheduling Appointments  [facility]ominic Lir[facility]  Call: [phone]    [facility]  Call: [phone]    [facility]  Call: [phone]    For Gastroenterology referrals   [facility]   [facility] Center, 4th Floor   [address] [place] [address]   Appointments: [phone]    WHERE TO GO FOR CARE?  Clinic    Make an appointment if you:       Are sick (cold, cough, flu, sore throat, earache or in pain).       Have a small injury (sprain, small cut, burn or broken bone).       Need a physical exam, Pap smear, vaccine or prescription refill.       Have questions about your health or medicines.    To reach us:      Call [phone] ([phone]). Open 24 hours every day. (For counseling services, call [phone].)    Log into [email or web address] at [email or web address]. (Visit [email or web address] to create an account.) Hospital emergency room    An emergency is a serious or life- threatening problem that must be treated right away.    Call 557 or get to the hospital if you have:      Very bad or sudden:            - Chest pain or pressure         - Bleeding         - Head or belly pain         - Dizziness or trouble seeing, walking or                          Speaking      Problems breathing      Blood in your vomit or you are coughing up blood      A major injury (knocked out, loss of a finger or limb, rape, broken bone protruding from skin)    A mental health crisis. (Or call the Mental Health Crisis line at 1-993.336.9529 or Suicide Prevention Hotline at 1-670.493.7681.)    Open 24 hours every day.  You don't need an appointment.     Urgent care    Visit urgent care for sickness or small injuries when the clinic is closed. You don't need an appointment. To check hours or find an urgent care near you, visit [email or web address]. Online care    Get online care from [facility] for more than 70 common problems, like colds, allergies and infections. Open 24 hours every day at:   [email or web address]   Need help deciding?    For advice about where to be seen, you may call your clinic and ask to speak with a nurse. We're here for you 24 hours every day.         If you are deaf or hard of hearing, please let us know. We provide many free services including sign language interpreters, oral interpreters, TTYs, telephone amplifiers, note takers and written materials.

## 2018-04-27 NOTE — LETTER
May 9, 2018      Silvia Braga  [address] [address]        Dear Silvia,       Your recent labs are normal.  Enclosed are the results.     Results for orders placed or performed in visit on 04/27/18   CBC with platelets and differential   Result Value Ref Range    WBC 8.1 4.0 - 11.0 10e9/L    RBC Count 3.89 3.8 - 5.2 10e12/L    Hemoglobin 12.0 11.7 - 15.7 g/dL    Hematocrit 36.4 35.0 - 47.0 %    MCV 94 78 - 100 fl    MCH 30.8 26.5 - 33.0 pg    MCHC 33.0 31.5 - 36.5 g/dL    RDW 12.5 10.0 - 15.0 %    Platelet Count 229 150 - 450 10e9/L    Diff Method Automated Method     % Neutrophils 65.2 %    % Lymphocytes 24.0 %    % Monocytes 9.5 %    % Eosinophils 1.2 %    % Basophils 0.1 %    Absolute Neutrophil 5.3 1.6 - 8.3 10e9/L    Absolute Lymphocytes 2.0 0.8 - 5.3 10e9/L    Absolute Monocytes 0.8 0.0 - 1.3 10e9/L    Absolute Eosinophils 0.1 0.0 - 0.7 10e9/L    Absolute Basophils 0.0 0.0 - 0.2 10e9/L   Basic metabolic panel   Result Value Ref Range    Sodium 141 133 - 144 mmol/L    Potassium 3.7 3.4 - 5.3 mmol/L    Chloride 108 94 - 109 mmol/L    Carbon Dioxide 22 20 - 32 mmol/L    Anion Gap 11 3 - 14 mmol/L    Glucose 74 70 - 99 mg/dL    Urea Nitrogen 9 7 - 30 mg/dL    Creatinine 0.71 0.52 - 1.04 mg/dL    GFR Estimate >90 >60 mL/min/1.7m2    GFR Estimate If Black >90 >60 mL/min/1.7m2    Calcium 9.1 8.5 - 10.1 mg/dL   TSH with free T4 reflex   Result Value Ref Range    TSH 1.42 0.40 - 4.00 mU/L   Beta HCG qual IFA urine   Result Value Ref Range    Beta HCG Qual IFA Urine Negative NEG^Negative          Please call with any questions.       Sincerely,        Rossy Barber DO

## 2018-04-27 NOTE — PROGRESS NOTES
"  SUBJECTIVE:   Silvia Braga is a 27 year old female who presents to clinic today for the following health issues:    Patient reports no changes or new symptoms since speaking with nurse yesterday    TRIAGE yesterday:  NURSING ASSESSMENT:  Description:  Dizzy spell x 2 - occurred when laying down. During day otherwise feeling constant pressure in her head. Not dizzy when walking. Not lightheadedness. Worse when looking at computer screen. Headache and ears ache at times. No vision changes. New contact prescription months ago and she doesn't think it would be related. Played volleyball last night and had to stop due to dizziness/nausea. Denies possibility of pregnancy.  Onset/duration:  2 weeks ago - sudden onset.  Precip. factors:  Nothing in particular  Associated symptoms:  As above. Denies fever, chest pain, weakness, confusion, irregular/fast heart rhythm, severe headache, vision changes    Currently patient has no symptoms of headache or vertigo.  Patient noted she currently has ear pain in the left ear. Two weeks ago, pain was noted to be in the right ear. Denies sensations of clicking or popping in the ears. Denies prior history of otitis media. However, patient noted to have symptoms of nausea all the time.     After epley today, patient experienced pressure, but no vertigo.      Patient does not know what could have contributed to symptoms noted, but symptoms became present upon arrival from [place] \"two Mondays ago\". When laying in bed, she experienced an episode of dizzy spells. She felt as if the room was spinning and experienced pressure in the head.  After onset, patient has experienced intermittent episodes of headaches that are described as a pressure sensation on bilateral temple area.  Headaches are rated at 7/10 in severity, and improves with ibuprofen. Patient noted that she has light sensitivities and looks at a computer screen 8 hours a day. After going inside to play volleyball after playing " outside, symptoms of nausea and vertigo began to be present.  Symptoms of headaches and vertigo are also noted to be absent in the morning and develop throughout the day. In addition, these symptoms do not wake her up from sleep.  Symptoms are noted to occur together at times. Today she walked to lunch and did not notice any symptoms. Patient noted that she did not usually have symptoms of vertigo in the past. Denies head trauma, congestion more than normal. Patient has no prior medical history of headaches or family history of migraines. She noted to be eating three meals daily and has not changed her diet recently. She is well hydrated. Patient does not drink coffee, does not drink a lot of caffeine, or changed her caffeine intake recently. Denies drinking more alcohol than usual. Denies recent vision changes other than a new prescription months ago. Denies neck or back pain that is more than usual for her. Denies history of anemia. Denies urinary or bowel symptoms.     Last menstrual period was noted to be on April 12. Menses are noted to be normal and light when they occur. Patient is taking Birth Control.       OF NOTE Patient has only had two episodes where symptoms were at the worst.            Problem list and histories reviewed & adjusted, as indicated.  Additional history: as documented    Patient Active Problem List   Diagnosis     Dysmenorrhea     Screening for malignant neoplasm of cervix     Herpes simplex vulvovaginitis     Abnormal cervical Papanicolaou smear, unspecified abnormal pap finding     History reviewed. No pertinent surgical history.    Social History   Substance Use Topics     Smoking status: Never Smoker     Smokeless tobacco: Never Used     Alcohol use Yes      Comment: socially     Family History   Problem Relation Age of Onset     Hypertension Mother      Hypertension Father          Current Outpatient Prescriptions   Medication Sig Dispense Refill     norgestimate-ethinyl estradiol  "(SPRINTEC 28) 0.25-35 MG-MCG per tablet Take 1 tablet by mouth daily 84 tablet 3     valACYclovir (VALTREX) 500 MG tablet Take 1 tablet (500 mg) by mouth daily 90 tablet 1     [DISCONTINUED] valACYclovir (VALTREX) 500 MG tablet TAKE 1 TABLET BY MOUTH TWICE A DAY 6 tablet 3     No Known Allergies  Recent Labs   Lab Test  06/16/17   1107 08/13/14   LDL  56  63   HDL  94  64   TRIG  95  159*   ALT   --   18   CR   --   1.01   GFRESTIMATED   --   >60   GFRESTBLACK   --   >60   POTASSIUM   --   3.8      BP Readings from Last 3 Encounters:   04/27/18 128/78   07/28/17 130/82   06/16/17 122/80    Wt Readings from Last 3 Encounters:   04/27/18 63.5 kg (140 lb)   07/28/17 59.9 kg (132 lb)   06/16/17 58.1 kg (128 lb)                  Labs reviewed in EPIC    Reviewed and updated as needed this visit by clinical staff       Reviewed and updated as needed this visit by Provider         ROS:  Constitutional, HEENT, cardiovascular, pulmonary, GI, , musculoskeletal, neuro, skin, endocrine and psych systems are negative, except as otherwise noted.    This document serves as a record of the services and decisions personally performed and made by Rossy Barber D.O. It was created on her behalf by Hong Torrez, a trained medical scribe. The creation of this document is based on the provider's statements to the medical scribe.  Hong Torrez 1:37 PM April 17, 2018      OBJECTIVE:     /78  Pulse 76  Temp 98.1  F (36.7  C) (Oral)  Ht 1.803 m (5' 11\")  Wt 63.5 kg (140 lb)  LMP 04/06/2018 (Approximate)  BMI 19.53 kg/m2  Body mass index is 19.53 kg/(m^2).  GENERAL: healthy, alert and no distress  EYES: Eyes grossly normal to inspection, PERRL and conjunctivae and sclerae normal  HENT: normal cephalic/atraumatic, ear canals and TM's normal, nose and mouth without ulcers or lesions, oropharynx clear and oral mucous membranes moist. Decrease light reflex and retracted TM on left   NECK: no adenopathy, no asymmetry, masses, or " scars and thyroid normal to palpation  RESP: lungs clear to auscultation - no rales, rhonchi or wheezes  CV: regular rate and rhythm, normal S1 S2, no S3 or S4, no murmur, click or rub, no peripheral edema and peripheral pulses strong  ABDOMEN: soft, nontender, no hepatosplenomegaly, no masses and bowel sounds normal  MS: no gross musculoskeletal defects noted, no edema  SKIN: no suspicious lesions or rashes  NEURO: Normal strength and tone, mentation intact and speech normal. epley negative  PSYCH: mentation appears normal, affect normal/bright    Diagnostic Test Results:  Results for orders placed or performed in visit on 04/27/18 (from the past 24 hour(s))   CBC with platelets and differential   Result Value Ref Range    WBC 8.1 4.0 - 11.0 10e9/L    RBC Count 3.89 3.8 - 5.2 10e12/L    Hemoglobin 12.0 11.7 - 15.7 g/dL    Hematocrit 36.4 35.0 - 47.0 %    MCV 94 78 - 100 fl    MCH 30.8 26.5 - 33.0 pg    MCHC 33.0 31.5 - 36.5 g/dL    RDW 12.5 10.0 - 15.0 %    Platelet Count 229 150 - 450 10e9/L    Diff Method Automated Method     % Neutrophils 65.2 %    % Lymphocytes 24.0 %    % Monocytes 9.5 %    % Eosinophils 1.2 %    % Basophils 0.1 %    Absolute Neutrophil 5.3 1.6 - 8.3 10e9/L    Absolute Lymphocytes 2.0 0.8 - 5.3 10e9/L    Absolute Monocytes 0.8 0.0 - 1.3 10e9/L    Absolute Eosinophils 0.1 0.0 - 0.7 10e9/L    Absolute Basophils 0.0 0.0 - 0.2 10e9/L   Beta HCG qual IFA urine   Result Value Ref Range    Beta HCG Qual IFA Urine Negative NEG^Negative          ASSESSMENT/PLAN:     BP Screening:   Last 3 BP Readings:    BP Readings from Last 3 Encounters:   04/27/18 128/78   07/28/17 130/82   06/16/17 122/80       The following was recommended to the patient:  Re-screen BP within a year and recommended lifestyle modifications  Tobacco Cessation:   reports that she has never smoked. She has never used smokeless tobacco.      BMI:   Estimated body mass index is 19.53 kg/(m^2) as calculated from the following:    Height  "as of this encounter: 1.803 m (5' 11\").    Weight as of this encounter: 63.5 kg (140 lb).     1. Dizziness  Labs today. Patient may take Meclizine as directed for dizziness.   - CBC with platelets and differential  - Basic metabolic panel  - TSH with free T4 reflex  - Beta HCG qual IFA urine  - meclizine (ANTIVERT) 25 MG tablet; Take 1 tablet (25 mg) by mouth every 6 hours as needed for dizziness  Dispense: 15 tablet; Refill: 0    2. Nausea  Labs today. Patient may take Meclizine as directed for nausea .   - Beta HCG qual IFA urine  - meclizine (ANTIVERT) 25 MG tablet; Take 1 tablet (25 mg) by mouth every 6 hours as needed for dizziness  Dispense: 15 tablet; Refill: 0    3. Headache in front of head  4. Light sensitivity  Labs today. Patient may take Meclizine as directed for nausea . Patient may also take Imitrex as directed for headaches. Discussed potentially life threatening differential diagnosis, got to ED if symptoms worsen or new symptoms arises  Follow up in 4 days here otherwise  - CBC with platelets and differential  - Basic metabolic panel  - meclizine (ANTIVERT) 25 MG tablet; Take 1 tablet (25 mg) by mouth every 6 hours as needed for dizziness  Dispense: 15 tablet; Refill: 0  - SUMAtriptan (IMITREX) 25 MG tablet; Take 1-2 tablets (25-50 mg) by mouth at onset of headache for migraine May repeat in 2 hours. Max 8 tablets/24 hours.  Dispense: 6 tablet; Refill: 0      Patient instructions discussed with patient    The information in this document, created by the medical scribe for me, accurately reflects the services I personally performed and the decisions made by me. I have reviewed and approved this document for accuracy prior to leaving the patient care area.  April 27, 2018 3:22 PM      Rossy Barber DO  [facility]    "

## 2018-04-27 NOTE — MR AVS SNAPSHOT
After Visit Summary   4/27/2018    Silvia Braga    MRN: [id number]           Patient Information     Date Of Birth          1990        Visit Information        Provider Department      4/27/2018 1:20 PM Rossy Barber DO [facility]        Today's Diagnoses     Dizziness    -  1    Nausea        Headache in front of head        Light sensitivity          Care Instructions    initial labs are negative  Use imitrex for headaches and meclizine as needed for dizziness  Follow up in 3-4 days with provider if not improving  Labs and urine test today. I will notify you of results when I receive them.     [facility]   Discharged by : Bibiana RAMÍREZ MA    If you have any questions regarding your visit please contact your care team:     Team Gold                Clinic Hours Telephone Number     Dr. Samina Marin NP 7am-7pm  Monday - Thursday   7am-5pm  Fridays  [phone]   (Appointment scheduling available 24/7)     RN Line  [phone] option 2     Urgent Care - [place] - 11am-9pm Monday-Friday Saturday-Sunday- 9am-5pm     [place] -   5pm-9pm Monday-Friday Saturday-Sunday- 9am-5pm    [phone] - [place]    [phone] - [place]       For a Price Quote for your services, please call our Consumer Price Line at [phone].     What options do I have for visits at the clinic other than the traditional office visit?     To expand how we care for you, many of our providers are utilizing electronic visits (e-visits) and telephone visits, when medically appropriate, for interactions with their patients rather than a visit in the clinic. We also offer nurse visits for many medical concerns. Just like any other service, we will bill your insurance company for this type of visit based on time spent on the phone with your provider. Not all  insurance companies cover these visits. Please check with your medical insurance if this type of visit is covered. You will be responsible for any charges that are not paid by your insurance.   E-visits via [employer]: generally incur a $35.00 fee.     Telephone visits:  Time spent on the phone: *charged based on time that is spent on the phone in increments of 10 minutes. Estimated cost:   5-10 mins $30.00   11-20 mins. $59.00   21-30 mins. $85.00       Use [email or web address] (secure email communication and access to your chart) to send your primary care provider a message or make an appointment. Ask someone on your Team how to sign up for [email or web address].     As always, Thank you for trusting us with your health care needs!      [facility]:    Scheduling Appointments  [facility]  Call: [phone]    Amilcar Escamilla [facility]  Call: [phone]    [facility]  Call: [phone]    For Gastroenterology referrals   [facility] Gastroenterology   Clinics and Surgery Center, 4th Floor   [address] [place] [address]   Appointments: [phone]    WHERE TO GO FOR CARE?  Clinic    Make an appointment if you:       Are sick (cold, cough, flu, sore throat, earache or in pain).       Have a small injury (sprain, small cut, burn or broken bone).       Need a physical exam, Pap smear, vaccine or prescription refill.       Have questions about your health or medicines.    To reach us:      Call [phone] ([phone]). Open 24 hours every day. (For counseling services, call [phone].)    Log into [email or web address] at [email or web address]. (Visit [email or web address] to create an account.) Hospital emergency room    An emergency is a serious or life- threatening problem that must be treated right away.    Call 335 or get to the hospital if you have:      Very bad or sudden:            - Chest pain or pressure         - Bleeding         - Head or belly  pain         - Dizziness or trouble seeing, walking or                          Speaking      Problems breathing      Blood in your vomit or you are coughing up blood      A major injury (knocked out, loss of a finger or limb, rape, broken bone protruding from skin)    A mental health crisis. (Or call the Mental Health Crisis line at 1-207.963.4743 or Suicide Prevention Hotline at 1-280.936.2069.)    Open 24 hours every day. You don't need an appointment.     Urgent care    Visit urgent care for sickness or small injuries when the clinic is closed. You don't need an appointment. To check hours or find an urgent care near you, visit [email or web address]. Online care    Get online care from [facility] for more than 70 common problems, like colds, allergies and infections. Open 24 hours every day at:   [email or web address]   Need help deciding?    For advice about where to be seen, you may call your clinic and ask to speak with a nurse. We're here for you 24 hours every day.         If you are deaf or hard of hearing, please let us know. We provide many free services including sign language interpreters, oral interpreters, TTYs, telephone amplifiers, note takers and written materials.                   Follow-ups after your visit        Your next 10 appointments already scheduled     Jun 18, 2018  8:00 AM CDT   PHYSICAL with Rossy Barber DO   [facility]    [address] [place] [id number] [phone]              Who to contact     If you have questions or need follow up information about today's clinic visit or your schedule please contact [facility] directly at [phone].  Normal or non-critical lab and imaging results will be communicated to you by MyChart, letter or phone within 4 business days after the clinic has received the results. If you do not hear from us within 7 days, please contact the clinic through [employer] or  "phone. If you have a critical or abnormal lab result, we will notify you by phone as soon as possible.  Submit refill requests through [employer] or call your pharmacy and they will forward the refill request to us. Please allow 3 business days for your refill to be completed.          Additional Information About Your Visit        [employer] Information     [employer] lets you send messages to your doctor, view your test results, renew your prescriptions, schedule appointments and more. To sign up, go to [email or web address] . Click on \"Log in\" on the left side of the screen, which will take you to the Welcome page. Then click on \"Sign up Now\" on the right side of the page.     You will be asked to enter the access code listed below, as well as some personal information. Please follow the directions to create your username and password.     Your access code is: [email or web address]  Expires: 7/10/2018  3:03 PM     Your access code will  in 90 days. If you need help or a new code, please call your [place] clinic or [phone].        Care EveryWhere ID     This is your Care EveryWhere ID. This could be used by other organizations to access your [place] medical records  [id number]        Your Vitals Were     Pulse Temperature Height Last Period BMI (Body Mass Index)       76 98.1  F (36.7  C) (Oral) 5' 11\" (1.803 m) 2018 (Approximate) 19.53 kg/m2        Blood Pressure from Last 3 Encounters:   18 128/78   17 130/82   17 122/80    Weight from Last 3 Encounters:   18 140 lb (63.5 kg)   17 132 lb (59.9 kg)   17 128 lb (58.1 kg)              We Performed the Following     Basic metabolic panel     Beta HCG qual IFA urine     CBC with platelets and differential     TSH with free T4 reflex          Today's Medication Changes          These changes are accurate as of 18  2:15 PM.  If you have any questions, ask your nurse or doctor.               Start taking these medicines.     "    Dose/Directions    meclizine 25 MG tablet   Commonly known as:  ANTIVERT   Used for:  Dizziness, Nausea, Headache in front of head, Light sensitivity   Started by:  Rossy Barber DO        Dose:  25 mg   Take 1 tablet (25 mg) by mouth every 6 hours as needed for dizziness   Quantity:  15 tablet   Refills:  0       SUMAtriptan 25 MG tablet   Commonly known as:  IMITREX   Used for:  Headache in front of head, Light sensitivity   Started by:  Rossy Barber DO        Dose:  25-50 mg   Take 1-2 tablets (25-50 mg) by mouth at onset of headache for migraine May repeat in 2 hours. Max 8 tablets/24 hours.   Quantity:  6 tablet   Refills:  0            Where to get your medicines      These medications were sent to [facility] [address] [place] [address].  [address] [place] [address], [place] [address]     Phone:  [phone]     meclizine 25 MG tablet    SUMAtriptan 25 MG tablet                Primary Care Provider Office Phone # Fax #    Christine Rianna Montesinos [phone]       [address] [place], [place] [id number]        Equal Access to Services     PAIGE JUÁREZ AH: Hadii iam oteroo Sotracy, waaxda luqadaha, qaybta kaalmada adeegyada, desiree adorno. So [facility] [phone].    ATENCIÓN: Si habla español, tiene a moon disposición servicios gratuitos de asistencia lingüística. [facility] [phone].    We comply with applicable federal civil rights laws and [place] laws. We do not discriminate on the basis of race, color, national origin, age, disability, sex, sexual orientation, or gender identity.            Thank you!     Thank you for choosing [facility]  for your care. Our goal is always to provide you with excellent care. Hearing back from our patients is one way we can continue to improve our services. Please take a few minutes to complete the written survey that you may receive in the mail  after your visit with us. Thank you!             Your Updated Medication List - Protect others around you: Learn how to safely use, store and throw away your medicines at www.disposemymeds.org.          This list is accurate as of 4/27/18  2:15 PM.  Always use your most recent med list.                   Brand Name Dispense Instructions for use Diagnosis    meclizine 25 MG tablet    ANTIVERT    15 tablet    Take 1 tablet (25 mg) by mouth every 6 hours as needed for dizziness    Dizziness, Nausea, Headache in front of head, Light sensitivity       norgestimate-ethinyl estradiol 0.25-35 MG-MCG per tablet    SPRINTEC 28    84 tablet    Take 1 tablet by mouth daily    Dysmenorrhea       SUMAtriptan 25 MG tablet    IMITREX    6 tablet    Take 1-2 tablets (25-50 mg) by mouth at onset of headache for migraine May repeat in 2 hours. Max 8 tablets/24 hours.    Headache in front of head, Light sensitivity       valACYclovir 500 MG tablet    VALTREX    90 tablet    Take 1 tablet (500 mg) by mouth daily    Herpes simplex vulvovaginitis

## 2018-04-28 LAB
ANION GAP SERPL CALCULATED.3IONS-SCNC: 11 MMOL/L (ref 3–14)
BUN SERPL-MCNC: 9 MG/DL (ref 7–30)
CALCIUM SERPL-MCNC: 9.1 MG/DL (ref 8.5–10.1)
CHLORIDE SERPL-SCNC: 108 MMOL/L (ref 94–109)
CO2 SERPL-SCNC: 22 MMOL/L (ref 20–32)
CREAT SERPL-MCNC: 0.71 MG/DL (ref 0.52–1.04)
GFR SERPL CREATININE-BSD FRML MDRD: >90 ML/MIN/1.7M2
GLUCOSE SERPL-MCNC: 74 MG/DL (ref 70–99)
POTASSIUM SERPL-SCNC: 3.7 MMOL/L (ref 3.4–5.3)
SODIUM SERPL-SCNC: 141 MMOL/L (ref 133–144)
TSH SERPL DL<=0.005 MIU/L-ACNC: 1.42 MU/L (ref 0.4–4)

## 2018-05-12 DIAGNOSIS — N94.6 DYSMENORRHEA: ICD-10-CM

## 2018-05-14 RX ORDER — NORGESTIMATE AND ETHINYL ESTRADIOL 0.25-0.035
KIT ORAL
Qty: 84 TABLET | Refills: 0 | Status: SHIPPED | OUTPATIENT
Start: 2018-05-14 | End: 2018-06-18

## 2018-05-14 NOTE — TELEPHONE ENCOUNTER
"Requested Prescriptions   Pending Prescriptions Disp Refills     SPRINTEC 28 0.25-35 MG-MCG per tablet [Pharmacy Med Name: SPRINTEC 28 DAY TABLET]  Last Written Prescription Date:  6/16/2017  Last Fill Quantity: 84 tablet,  # refills: 3   Last office visit: 4/27/2018 with prescribing provider:  GIULIANO Barber   Future Office Visit:   Next 5 appointments (look out 90 days)     Jun 18, 2018  8:00 AM CDT   PHYSICAL with Rossy Barber DO   [facility] ([address] [id number] [phone]                  84 tablet 3     Sig: TAKE 1 TABLET BY MOUTH DAILY    Contraceptives Protocol Passed    5/12/2018  6:54 AM       Passed - Patient is not a current smoker if age is 35 or older       Passed - Recent (12 mo) or future (30 days) visit within the authorizing provider's specialty    Patient had office visit in the last 12 months or has a visit in the next 30 days with authorizing provider or within the authorizing provider's specialty.  See \"Patient Info\" tab in inbasket, or \"Choose Columns\" in Meds & Orders section of the refill encounter.           Passed - No active pregnancy on record       Passed - No positive pregnancy test in past 12 months          "

## 2018-06-18 ENCOUNTER — OFFICE VISIT (OUTPATIENT)
Dept: FAMILY MEDICINE | Facility: CLINIC | Age: 28
End: 2018-06-18
Payer: COMMERCIAL

## 2018-06-18 VITALS
HEART RATE: 76 BPM | TEMPERATURE: 98.2 F | WEIGHT: 144 LBS | BODY MASS INDEX: 20.16 KG/M2 | SYSTOLIC BLOOD PRESSURE: 128 MMHG | HEIGHT: 71 IN | DIASTOLIC BLOOD PRESSURE: 80 MMHG

## 2018-06-18 DIAGNOSIS — R87.619 ABNORMAL CERVICAL PAPANICOLAOU SMEAR, UNSPECIFIED ABNORMAL PAP FINDING: Primary | ICD-10-CM

## 2018-06-18 DIAGNOSIS — Z00.01 ENCOUNTER FOR ROUTINE ADULT HEALTH EXAMINATION WITH ABNORMAL FINDINGS: ICD-10-CM

## 2018-06-18 DIAGNOSIS — Z23 NEED FOR PROPHYLACTIC VACCINATION WITH TETANUS-DIPHTHERIA (TD): ICD-10-CM

## 2018-06-18 DIAGNOSIS — N94.6 DYSMENORRHEA: ICD-10-CM

## 2018-06-18 DIAGNOSIS — G44.219 EPISODIC TENSION-TYPE HEADACHE, NOT INTRACTABLE: ICD-10-CM

## 2018-06-18 DIAGNOSIS — Z11.4 SCREENING FOR HIV (HUMAN IMMUNODEFICIENCY VIRUS): ICD-10-CM

## 2018-06-18 DIAGNOSIS — Z11.3 SCREEN FOR STD (SEXUALLY TRANSMITTED DISEASE): ICD-10-CM

## 2018-06-18 PROCEDURE — 90471 IMMUNIZATION ADMIN: CPT | Performed by: FAMILY MEDICINE

## 2018-06-18 PROCEDURE — 90715 TDAP VACCINE 7 YRS/> IM: CPT | Performed by: FAMILY MEDICINE

## 2018-06-18 PROCEDURE — 87591 N.GONORRHOEAE DNA AMP PROB: CPT | Performed by: FAMILY MEDICINE

## 2018-06-18 PROCEDURE — 87491 CHLMYD TRACH DNA AMP PROBE: CPT | Performed by: FAMILY MEDICINE

## 2018-06-18 PROCEDURE — 99395 PREV VISIT EST AGE 18-39: CPT | Mod: 25 | Performed by: FAMILY MEDICINE

## 2018-06-18 PROCEDURE — 88175 CYTOPATH C/V AUTO FLUID REDO: CPT | Performed by: FAMILY MEDICINE

## 2018-06-18 PROCEDURE — 87624 HPV HI-RISK TYP POOLED RSLT: CPT | Performed by: FAMILY MEDICINE

## 2018-06-18 RX ORDER — NORGESTIMATE AND ETHINYL ESTRADIOL 0.25-0.035
1 KIT ORAL DAILY
Qty: 84 TABLET | Refills: 3 | Status: SHIPPED | OUTPATIENT
Start: 2018-06-18 | End: 2018-07-24

## 2018-06-18 NOTE — PATIENT INSTRUCTIONS
Tetanus Vaccine today     Preventive Health Recommendations  Female Ages 26 - 39  Yearly exam:   See your health care provider every year in order to    Review health changes.     Discuss preventive care.      Review your medicines if you your doctor has prescribed any.    Until age 30: Get a Pap test every three years (more often if you have had an abnormal result).    After age 30: Talk to your doctor about whether you should have a Pap test every 3 years or have a Pap test with HPV screening every 5 years.   You do not need a Pap test if your uterus was removed (hysterectomy) and you have not had cancer.  You should be tested each year for STDs (sexually transmitted diseases), if you're at risk.   Talk to your provider about how often to have your cholesterol checked.  If you are at risk for diabetes, you should have a diabetes test (fasting glucose).  Shots: Get a flu shot each year. Get a tetanus shot every 10 years.   Nutrition:     Eat at least 5 servings of fruits and vegetables each day.    Eat whole-grain bread, whole-wheat pasta and brown rice instead of white grains and rice.    Talk to your provider about Calcium and Vitamin D.     Lifestyle    Exercise at least 150 minutes a week (30 minutes a day, 5 days of the week). This will help you control your weight and prevent disease.    Limit alcohol to one drink per day.    No smoking.     Wear sunscreen to prevent skin cancer.    See your dentist every six months for an exam and cleaning.

## 2018-06-18 NOTE — LETTER
[facility]  [address] [place] [id number] [phone]                                                                                                June 22, 2018    Silvia Braga  [address]  [address]  [facility] [address]        Dear Ms. Braga,    The results of your recent lab tests were within normal limits. Enclosed is a copy of these results.  If you have any further questions or problems, please contact our office.    Component      Latest Ref Rng & Units 6/18/2018   Specimen Description       Cervix   Chlamydia Trachomatis PCR      NEG:Negative Negative   Specimen Descrip       Cervix   N Gonorrhea PCR      NEG:Negative Negative     Sincerely,      Rossy Barber, DO/mv

## 2018-06-18 NOTE — NURSING NOTE
Prior to injection verified patient identity using patient's name and date of birth.    Due to injection administration, patient instructed to remain in clinic for 15 minutes  afterwards, and to report any adverse reaction to me immediately.      Screening Questionnaire for Adult Immunization    Are you sick today?   No   Do you have allergies to medications, food, a vaccine component or latex?   No   Have you ever had a serious reaction after receiving a vaccination?   No   Do you have a long-term health problem with heart disease, lung disease, asthma, kidney disease, metabolic disease (e.g. diabetes), anemia, or other blood disorder?   No   Do you have cancer, leukemia, HIV/AIDS, or any other immune system problem?   No   In the past 3 months, have you taken medications that affect  your immune system, such as prednisone, other steroids, or anticancer drugs; drugs for the treatment of rheumatoid arthritis, Crohn s disease, or psoriasis; or have you had radiation treatments?   No   Have you had a seizure, or a brain or other nervous system problem?   No   During the past year, have you received a transfusion of blood or blood     products, or been given immune (gamma) globulin or antiviral drug?   No   For women: Are you pregnant or is there a chance you could become        pregnant during the next month?   No   Have you received any vaccinations in the past 4 weeks?   No     Immunization questionnaire answers were all negative.        Per orders of Dr. Barber, injection of Adacel given by Bibiana Gregory. Patient instructed to remain in clinic for 15 minutes afterwards, and to report any adverse reaction to me immediately.       Screening performed by Bibiana Gregory on 6/18/2018 at 8:49 AM.

## 2018-06-18 NOTE — MR AVS SNAPSHOT
After Visit Summary   6/18/2018    Silvia Braga    MRN: [id number]           Patient Information     Date Of Birth          1990        Visit Information        Provider Department      6/18/2018 8:00 AM Rossy Barber DO [facility]        Today's Diagnoses     Abnormal cervical Papanicolaou smear, unspecified abnormal pap finding    -  1    Screening for HIV (human immunodeficiency virus)        Need for prophylactic vaccination with tetanus-diphtheria (TD)        Dysmenorrhea        Encounter for routine adult health examination with abnormal findings        Episodic tension-type headache, not intractable        Screen for STD (sexually transmitted disease)          Care Instructions    Tetanus Vaccine today     Preventive Health Recommendations  Female Ages 26 - 39  Yearly exam:   See your health care provider every year in order to    Review health changes.     Discuss preventive care.      Review your medicines if you your doctor has prescribed any.    Until age 30: Get a Pap test every three years (more often if you have had an abnormal result).    After age 30: Talk to your doctor about whether you should have a Pap test every 3 years or have a Pap test with HPV screening every 5 years.   You do not need a Pap test if your uterus was removed (hysterectomy) and you have not had cancer.  You should be tested each year for STDs (sexually transmitted diseases), if you're at risk.   Talk to your provider about how often to have your cholesterol checked.  If you are at risk for diabetes, you should have a diabetes test (fasting glucose).  Shots: Get a flu shot each year. Get a tetanus shot every 10 years.   Nutrition:     Eat at least 5 servings of fruits and vegetables each day.    Eat whole-grain bread, whole-wheat pasta and brown rice instead of white grains and rice.    Talk to your provider about Calcium and Vitamin D.     Lifestyle    Exercise at least 150  "minutes a week (30 minutes a day, 5 days of the week). This will help you control your weight and prevent disease.    Limit alcohol to one drink per day.    No smoking.     Wear sunscreen to prevent skin cancer.    See your dentist every six months for an exam and cleaning.            Follow-ups after your visit        Who to contact     If you have questions or need follow up information about today's clinic visit or your schedule please contact [facility] directly at [phone].  Normal or non-critical lab and imaging results will be communicated to you by MyChart, letter or phone within 4 business days after the clinic has received the results. If you do not hear from us within 7 days, please contact the clinic through MyChart or phone. If you have a critical or abnormal lab result, we will notify you by phone as soon as possible.  Submit refill requests through [employer] or call your pharmacy and they will forward the refill request to us. Please allow 3 business days for your refill to be completed.          Additional Information About Your Visit        Care EveryWhere ID     This is your Care EveryWhere ID. This could be used by other organizations to access your [facility] medical records  [id number]        Your Vitals Were     Pulse Temperature Height Last Period BMI (Body Mass Index)       76 98.2  F (36.8  C) (Oral) 5' 11\" (1.803 m) 06/04/2018 20.08 kg/m2        Blood Pressure from Last 3 Encounters:   06/18/18 128/80   04/27/18 128/78   07/28/17 130/82    Weight from Last 3 Encounters:   06/18/18 144 lb (65.3 kg)   04/27/18 140 lb (63.5 kg)   07/28/17 132 lb (59.9 kg)              We Performed the Following     Chlamydia trachomatis PCR     Neisseria gonorrhoeae PCR     Pap imaged thin layer diagnostic with HPV (select HPV order below)     TDAP VACCINE (ADACEL)          Where to get your medicines      These medications were sent to [facility] [id number] [place], [place] - [address]  [address], [place] [address]     Phone:  [phone]     norgestimate-ethinyl estradiol 0.25-35 MG-MCG per tablet          Primary Care Provider Office Phone # Fax #    Christine Rianna Montesinos [phone]       [address] [facility] [address]        Equal Access to Services     [facility] : Hadii aad ku hadasho Soomaali, waaxda luqadaha, qaybta kaalmada adeegyada, waxay idiin hayaan adeeg kharash la'aan ah. So [facility] [phone].    ATENCIÓN: Si habla español, tiene a moon disposición servicios gratuitos de asistencia lingüística. Charli al [phone].    We comply with applicable federal civil rights laws and [place] laws. We do not discriminate on the basis of race, color, national origin, age, disability, sex, sexual orientation, or gender identity.            Thank you!     Thank you for choosing [facility]  for your care. Our goal is always to provide you with excellent care. Hearing back from our patients is one way we can continue to improve our services. Please take a few minutes to complete the written survey that you may receive in the mail after your visit with us. Thank you!             Your Updated Medication List - Protect others around you: Learn how to safely use, store and throw away your medicines at www.disposemymeds.org.          This list is accurate as of 6/18/18  8:43 AM.  Always use your most recent med list.                   Brand Name Dispense Instructions for use Diagnosis    meclizine 25 MG tablet    ANTIVERT    15 tablet    Take 1 tablet (25 mg) by mouth every 6 hours as needed for dizziness    Dizziness, Nausea, Headache in front of head, Light sensitivity       norgestimate-ethinyl estradiol 0.25-35 MG-MCG per tablet    SPRINTEC 28    84 tablet    Take 1 tablet by mouth daily    Dysmenorrhea       SUMAtriptan 25 MG tablet    IMITREX    6 tablet    Take 1-2 tablets (25-50 mg) by mouth at onset of headache for migraine  May repeat in 2 hours. Max 8 tablets/24 hours.    Headache in front of head, Light sensitivity       valACYclovir 500 MG tablet    VALTREX    90 tablet    Take 1 tablet (500 mg) by mouth daily    Herpes simplex vulvovaginitis

## 2018-06-18 NOTE — PROGRESS NOTES
SUBJECTIVE:   CC: Silvia Braga is an 28 year old woman who presents for preventive health visit.     Physical   Annual:     Getting at least 3 servings of Calcium per day::  Yes    Bi-annual eye exam::  Yes    Dental care twice a year::  Yes    Sleep apnea or symptoms of sleep apnea::  None    Frequency of exercise::  2-3 days/week    Duration of exercise::  30-45 minutes    Taking medications regularly::  Yes    Medication side effects::  None    Additional concerns today::  No            Form  Form for work today.     PAP with HPV  HPV was positive last year. Patient reports that she has needed colposcopy for the past two years.    Labs  Reviewed and discussed with patient     Immunization  Tetanus Vaccine due today.     STD testing and HIV Screening   Not currently sexual active. No STD concerns. Patient declines HIV screening at this time.     Menses and Birth Control  Patient reports that she has been on Sprintec for a while now and noted to do well with this. She reported that she started using this at a young age due to menstrual cramps. Patient was seen in the clinic in past for dizziness and patient reported that this was only one episode and has resolved since. She was prescribed Imitrex for migraines, but patient reports that migraines are not as frequent now and does not wake up with headaches.      Patient declines other forms of contraception at this time.     Menses were noted to last two days and not heavy.     Dentist  She follows up with dentistry.     Vitamin D  She does not take Vitamin D    Today's PHQ-2 Score:   PHQ-2 ( 1999 Pfizer) 6/18/2018   Q1: Little interest or pleasure in doing things 0   Q2: Feeling down, depressed or hopeless 0   PHQ-2 Score 0   Q1: Little interest or pleasure in doing things Not at all   Q2: Feeling down, depressed or hopeless Not at all   PHQ-2 Score 0       Abuse: Current or Past(Physical, Sexual or Emotional)- No  Do you feel safe in your environment -  Yes    Social History   Substance Use Topics     Smoking status: Never Smoker     Smokeless tobacco: Never Used     Alcohol use Yes      Comment: socially     Alcohol Use 6/18/2018   If you drink alcohol do you typically have greater than 3 drinks per day OR greater than 7 drinks per week? No   No flowsheet data found.    Reviewed orders with patient.  Reviewed health maintenance and updated orders accordingly - Yes  Labs reviewed in EPIC  BP Readings from Last 3 Encounters:   06/18/18 128/80   04/27/18 128/78   07/28/17 130/82    Wt Readings from Last 3 Encounters:   06/18/18 65.3 kg (144 lb)   04/27/18 63.5 kg (140 lb)   07/28/17 59.9 kg (132 lb)                  Patient Active Problem List   Diagnosis     Dysmenorrhea     Screening for malignant neoplasm of cervix     Herpes simplex vulvovaginitis     Abnormal cervical Papanicolaou smear, unspecified abnormal pap finding     No past surgical history on file.    Social History   Substance Use Topics     Smoking status: Never Smoker     Smokeless tobacco: Never Used     Alcohol use Yes      Comment: socially     Family History   Problem Relation Age of Onset     Hypertension Mother      Hypertension Father          Current Outpatient Prescriptions   Medication Sig Dispense Refill     meclizine (ANTIVERT) 25 MG tablet Take 1 tablet (25 mg) by mouth every 6 hours as needed for dizziness 15 tablet 0     norgestimate-ethinyl estradiol (SPRINTEC 28) 0.25-35 MG-MCG per tablet Take 1 tablet by mouth daily 84 tablet 3     SUMAtriptan (IMITREX) 25 MG tablet Take 1-2 tablets (25-50 mg) by mouth at onset of headache for migraine May repeat in 2 hours. Max 8 tablets/24 hours. 6 tablet 0     valACYclovir (VALTREX) 500 MG tablet Take 1 tablet (500 mg) by mouth daily 90 tablet 1     [DISCONTINUED] SPRINTEC 28 0.25-35 MG-MCG per tablet TAKE 1 TABLET BY MOUTH DAILY 84 tablet 0     No Known Allergies  Recent Labs   Lab Test  04/27/18   1346  06/16/17   1107 08/13/14   LDL   --   56   "63   HDL   --   94  64   TRIG   --   95  159*   ALT   --    --   18   CR  0.71   --   1.01   GFRESTIMATED  >90   --   >60   GFRESTBLACK  >90   --   >60   POTASSIUM  3.7   --   3.8   TSH  1.42   --    --         Mammogram not appropriate for this patient based on age.    Pertinent mammograms are reviewed under the imaging tab.  History of abnormal Pap smear: Abnormal HPV, but normal PAP    Reviewed and updated as needed this visit by clinical staff  Tobacco  Allergies  Meds         Reviewed and updated as needed this visit by Provider        Past Medical History:   Diagnosis Date     Abnormal cervical Papanicolaou smear, unspecified abnormal pap finding 6/16/2017    Patient reports colposcopy in 2016  -- awaiting records     Dysmenorrhea 6/16/2017     Herpes simplex vulvovaginitis 6/16/2017    One outbreak at diagnosis in 2015      No past surgical history on file.  Obstetric History     No data available          Review of Systems  10 point ROS of systems including Constitutional, Eyes, Respiratory, Cardiovascular, Gastroenterology, Genitourinary, Integumentary, Muscularskeletal, Psychiatric were all negative except for pertinent positives noted in my HPI.    This document serves as a record of the services and decisions personally performed and made by Rossy Barber D.O. It was created on her behalf by Hong Torrez, a trained medical scribe. The creation of this document is based on the provider's statements to the medical scribe.  Hong Torrez June 18, 2018 8:27 AM       OBJECTIVE:   /80 (BP Location: Right arm, Patient Position: Sitting, Cuff Size: Adult Regular)  Pulse 76  Temp 98.2  F (36.8  C) (Oral)  Ht 5' 11\" (1.803 m)  Wt 144 lb (65.3 kg)  LMP 06/04/2018  BMI 20.08 kg/m2  Physical Exam  GENERAL: healthy, alert and no distress  EYES: Eyes grossly normal to inspection, PERRL and conjunctivae and sclerae normal  HENT: ear canals and TM's normal, nose and mouth without ulcers or lesions  NECK: " no adenopathy, no asymmetry, masses, or scars and thyroid normal to palpation  RESP: lungs clear to auscultation - no rales, rhonchi or wheezes  BREAST: normal without masses, tenderness or nipple discharge and no palpable axillary masses or adenopathy  CV: regular rate and rhythm, normal S1 S2, no S3 or S4, no murmur, click or rub, no peripheral edema and peripheral pulses strong  ABDOMEN: soft, nontender, no hepatosplenomegaly, no masses and bowel sounds normal   (female): normal female external genitalia, normal urethral meatus , vaginal mucosa pink, moist, well rugated, normal cervix, adnexae, and uterus without masses. and small amount of discharge from the Os.   MS: no gross musculoskeletal defects noted, no edema  SKIN: no suspicious lesions or rashes  NEURO: Normal strength and tone, mentation intact and speech normal  PSYCH: mentation appears normal, affect normal/bright    ASSESSMENT/PLAN:   1. Screening for HIV (human immunodeficiency virus)  Patient declined at this time.     2. Need for prophylactic vaccination with tetanus-diphtheria (TD)  - TDAP VACCINE (ADACEL)    3. Dysmenorrhea  Patient will continue to take SPRINTEC 28 and declines other forms of contraception at this time. I communicated with her about other forms of contraception and risks to SPRINTEC 28 due to patient having a history of migraines. Migraines currently were noted to not be as frequent as previous.     - norgestimate-ethinyl estradiol (SPRINTEC 28) 0.25-35 MG-MCG per tablet; Take 1 tablet by mouth daily  Dispense: 84 tablet; Refill: 3    4. Abnormal cervical Papanicolaou smear, unspecified abnormal pap finding  HPV detected last year. Recheck today.   - Pap imaged thin layer diagnostic with HPV (select HPV order below)    5. Encounter for routine adult health examination with abnormal findings      6. Episodic tension-type headache, not intractable  Patient reports that migraines are not as frequent as previous and denies  "waking up with headaches.     7. Screen for STD (sexually transmitted disease)  - Chlamydia trachomatis PCR  - Neisseria gonorrhoeae PCR    COUNSELING:  Reviewed preventive health counseling, as reflected in patient instructions       Regular exercise       Healthy diet/nutrition       Immunizations    Vaccinated for: TDAP             Contraception       Safe sex practices/STD prevention       HIV screeninx in teen years, 1x in adult years, and at intervals if high risk    BP Screening:   Last 3 BP Readings:    BP Readings from Last 3 Encounters:   18 128/80   18 128/78   17 130/82       The following was recommended to the patient:  Re-screen BP within a year and recommended lifestyle modifications     reports that she has never smoked. She has never used smokeless tobacco.    Estimated body mass index is 20.08 kg/(m^2) as calculated from the following:    Height as of this encounter: 5' 11\" (1.803 m).    Weight as of this encounter: 144 lb (65.3 kg).       Counseling Resources:  ATP IV Guidelines  Pooled Cohorts Equation Calculator  Breast Cancer Risk Calculator  FRAX Risk Assessment  ICSI Preventive Guidelines  Dietary Guidelines for Americans, 2010  USDA's MyPlate  ASA Prophylaxis  Lung CA Screening    The information in this document, created by the medical scribe for me, accurately reflects the services I personally performed and the decisions made by me. I have reviewed and approved this document for accuracy prior to leaving the patient care area.  2018 9:02 AM      Rossy Barber DO  [facility]  "

## 2018-06-19 LAB
C TRACH DNA SPEC QL NAA+PROBE: NEGATIVE
COPATH REPORT: NORMAL
N GONORRHOEA DNA SPEC QL NAA+PROBE: NEGATIVE
PAP: NORMAL
SPECIMEN SOURCE: NORMAL
SPECIMEN SOURCE: NORMAL

## 2018-06-20 ENCOUNTER — TELEPHONE (OUTPATIENT)
Dept: FAMILY MEDICINE | Facility: CLINIC | Age: 28
End: 2018-06-20

## 2018-06-20 LAB
FINAL DIAGNOSIS: ABNORMAL
HPV HR 12 DNA CVX QL NAA+PROBE: POSITIVE
HPV16 DNA SPEC QL NAA+PROBE: NEGATIVE
HPV18 DNA SPEC QL NAA+PROBE: NEGATIVE
SPECIMEN DESCRIPTION: ABNORMAL
SPECIMEN SOURCE CVX/VAG CYTO: ABNORMAL

## 2018-06-20 NOTE — TELEPHONE ENCOUNTER
Reason for Call:  Other call back    Detailed comments: pt called back in. Will be waiting for a call back.    Phone Number Patient can be reached at: Home number on file [phone] (home)    Best Time:     Can we leave a detailed message on this number? YES    Call taken on 6/20/2018 at 4:43 PM by Natacha Fonseca

## 2018-06-20 NOTE — TELEPHONE ENCOUNTER
As discussed in the room with patient at the time of visit, estrogen based hormones can worsen headaches but is great for skin, she wanted to conintue on her current one for those reasons and reported that her headaches were ok  If her headaches are coming on then she should get on IUD or progesterone pill but not going to improve skin as her current one  Rossy Barber D.O.

## 2018-06-20 NOTE — TELEPHONE ENCOUNTER
Route patient response to PCP for recommendation.    Patient reports she has been doing some research.  She states she isn't really interested in Nuvaring, or the implant.  She enjoys the benefits that she experiences with her current oral contraceptive, such as clear skin and decreased menstrual cramping, she just wants to improve her headaches.  She states she is open to trying a different oral contraceptive, or the Mirena IUD if this would continue to provide the benefit of decreased menstrual cramps and clear skin.     Mallika Brown RN'

## 2018-06-20 NOTE — TELEPHONE ENCOUNTER
Called and spoke with patient. She was in on Monday for physical, and a month earlier for headaches and dizziness. Dr. Barber said that it may be the birth control that she's on and she could consider a lower dose or a different medication. She is not having any new or concerning symptoms in regard to the headache. Will route to provider to advise.     Isaac Macias RN

## 2018-06-20 NOTE — TELEPHONE ENCOUNTER
Reason for call:  Patient reporting a symptom    Symptom or request: headaches     Duration (how long have symptoms been present): a few months     Have you been treated for this before? Yes    Additional comments: might be coming from birth control/was seen by PCP she would like to know what other birth control she could use    Phone Number patient can be reached at:  Home number on file [phone] (home)    Best Time:  any    Can we leave a detailed message on this number:  YES    Call taken on 6/20/2018 at 2:31 PM by Isabel Stockton

## 2018-06-22 NOTE — TELEPHONE ENCOUNTER
Huddled with Dr Barber. She will do the IUD placement and [place] at one visit but she needs patient to schedule a IUD consult.      Called patient and relayed Dr Barber message above.  I also explained that if she has an OBGYN do her IUD placement she will not need a separate appointment for IUD consult.  Patient decided she would call [facility] and schedule with them.    Cady Pedersen

## 2018-06-22 NOTE — TELEPHONE ENCOUNTER
Called patient- she needs a colp and wants a IUD placed. Seen 6/18 for OCPs. She is willing to do two appointments and is aware a consult is usually needed first, but is wondering if she can do a colp and and IUD together at the procedure appt or if OB would be better.   Mary Crockett RN

## 2018-06-27 ENCOUNTER — OFFICE VISIT (OUTPATIENT)
Dept: OBGYN | Facility: CLINIC | Age: 28
End: 2018-06-27
Payer: COMMERCIAL

## 2018-06-27 VITALS
HEART RATE: 97 BPM | SYSTOLIC BLOOD PRESSURE: 128 MMHG | WEIGHT: 143.7 LBS | TEMPERATURE: 99.2 F | DIASTOLIC BLOOD PRESSURE: 90 MMHG | OXYGEN SATURATION: 100 % | BODY MASS INDEX: 20.12 KG/M2 | HEIGHT: 71 IN

## 2018-06-27 DIAGNOSIS — Z30.430 ENCOUNTER FOR IUD INSERTION: Primary | ICD-10-CM

## 2018-06-27 LAB — BETA HCG QUAL IFA URINE: NEGATIVE

## 2018-06-27 PROCEDURE — 99203 OFFICE O/P NEW LOW 30 MIN: CPT | Mod: 25 | Performed by: OBSTETRICS & GYNECOLOGY

## 2018-06-27 PROCEDURE — 58300 INSERT INTRAUTERINE DEVICE: CPT | Performed by: OBSTETRICS & GYNECOLOGY

## 2018-06-27 PROCEDURE — 84703 CHORIONIC GONADOTROPIN ASSAY: CPT | Performed by: OBSTETRICS & GYNECOLOGY

## 2018-06-27 NOTE — PROGRESS NOTES
CC/HPI:  28 year old  presents for discussion of contraception.   Has been on OCPs since age 15 for dysmenorrhea. Has tolerated them well, with improvement in periods and acne.   Recently had onset of possible migraine with aura, with partial loss of vision followed by 7 days or more of mild headache.   BP has been noted to be mildly elevated at last two appointments.   Recent NIL pap with high risk HPV, not 16/18  Recent neg GC/CT    Past Medical History:   Diagnosis Date     Abnormal cervical Papanicolaou smear, unspecified abnormal pap finding 2017    Patient reports colposcopy in 2016  -- awaiting records     ASCUS with positive high risk HPV cervical 2016     Dysmenorrhea 2017     Herpes simplex vulvovaginitis 2017    One outbreak at diagnosis in      Papanicolaou smear of cervix with low grade squamous intraepithelial lesion (LGSIL) 10/11/2013       No past surgical history on file.    Family History   Problem Relation Age of Onset     Hypertension Mother      Hypertension Father        Social History     Social History     Marital status: Single     Spouse name: N/A     Number of children: N/A     Years of education: N/A     Occupational History     Not on file.     Social History Main Topics     Smoking status: Never Smoker     Smokeless tobacco: Never Used     Alcohol use Yes      Comment: socially     Drug use: No     Sexual activity: Not Currently     Partners: Male     Birth control/ protection: Pill     Other Topics Concern     Not on file     Social History Narrative         Current Outpatient Prescriptions:      norgestimate-ethinyl estradiol (SPRINTEC 28) 0.25-35 MG-MCG per tablet, Take 1 tablet by mouth daily, Disp: 84 tablet, Rfl: 3     valACYclovir (VALTREX) 500 MG tablet, Take 1 tablet (500 mg) by mouth daily, Disp: 90 tablet, Rfl: 1    No Known Allergies    Exam:  Vitals:    18 1014 18 1027   BP: (!) 132/91 128/90   BP Location: Right arm    Patient  "Position: Sitting    Cuff Size: Adult Regular    Pulse: 97    Temp: 99.2  F (37.3  C)    TempSrc: Oral    SpO2: 100%    Weight: 143 lb 11.2 oz (65.2 kg)    Height: 5' 11\" (1.803 m)      Body mass index is 20.04 kg/(m^2).     Exam:  Constitutional: healthy, alert and no distress  Gastrointestinal: Abdomen soft, non-tender. BS normal. No masses, organomegaly  : Normal external genitalia without lesions and uterus normal in size and contour, anteverted, adnexa normal.   Musculoskeletal: extremities normal- no gross deformities noted, gait normal and normal muscle tone  Skin: no suspicious lesions or rashes  Psychiatric: mentation appears normal and affect normal/bright      A/P:  28 year old  with contraceptive consult.  - Discussed options: Pills, patch, ring, Nexplanon, depo Provera, IUDs, barriers.   - Discussed these methods do not prevent STI without condoms  - Given recent onset of possible migraine with aura and mild hypertension, recommend switching away from estrogen containing method. Discussed minimal systemic progestin with Mirena IUD, systemic effect will mimic patient's baseline.   - Discussed Mirena insertion, risks, bleeding profile, efficacy, duration.   - Mirena placed without difficulty. Will check strings at upcoming appointment.    20 min was spent with this patient that does not include time spent on the procedure and over 50 % of the 20 min was spent counseling on contraception    IUD INSERTION PROCEDURE    Silvia Braga is a 28 year old female  who presents for Mirena IUD insertion.  Indication for IUD insertion is contraception.  Patient's last menstrual period was 2018. .  The patient is currently using oral contraceptives for contraception.  She is not in a monogamous sexual relationship.     Lab Results   Component Value Date    PAP NIL 2018     GC/CT neg  Results for orders placed or performed in visit on 18   Beta HCG qual IFA urine   Result Value Ref " Range    Beta HCG Qual IFA Urine Negative NEG^Negative          A complete discussion of the risks and benefits of IUD use and the details of the insertion procedure was held with the patient.    All questions were answered.  A consent form was signed.    Prior to the beginning of the procedure the team paused to verify the patient's identity, as well as the procedure to be performed and the site.  All equipment required was ready and available. The patient was positioned appropriately.     IUD Lot # NDC: [id number] LOT: [id number] EXP: 01/2021    The patient was placed in low lithotomy.  A bimanual exam was performed and the uterus noted to be anteverted.  A speculum was placed and the cervix swabbed with Betadine.  A tenaculum was placed on the anterior cervical lip. A paracervical block was performed.  The fundus sounded to 7 cm. The Mirena IUD was placed to the uterine fundus without difficulty.  The strings were cut to 3 cms.  The tenaculum was removed and hemostasis was ensured.  The speculum was removed.  The patient tolerated the procedure well.    PLAN:   The patient was asked to contact the clinic for any fever/chills/severe pelvic or abdominal pain or heavy bleeding. She was instructed in how to palpate her IUD strings.    FOLLOW-UP:  She was asked to follow up prn, and for her routine annual screening.

## 2018-06-27 NOTE — PATIENT INSTRUCTIONS

## 2018-06-27 NOTE — NURSING NOTE
"Chief Complaint   Patient presents with     Contraception     Mirena. NDC: 54644-769-93 LOT: [id number] EXP: 2021     New Patient       Initial /90  Pulse 97  Temp 99.2  F (37.3  C) (Oral)  Ht 5' 11\" (1.803 m)  Wt 143 lb 11.2 oz (65.2 kg)  LMP 2018  SpO2 100%  Breastfeeding? No  BMI 20.04 kg/m2 Estimated body mass index is 20.04 kg/(m^2) as calculated from the following:    Height as of this encounter: 5' 11\" (1.803 m).    Weight as of this encounter: 143 lb 11.2 oz (65.2 kg).  BP completed using cuff size: regular        The following HM Due: NONE      The following patient reported/Care Every where data was sent to:  P ABSTRACT QUALITY INITIATIVES [[id number]]        patient has appointment for today    Niyah Dunlap CMA                "

## 2018-06-27 NOTE — MR AVS SNAPSHOT
After Visit Summary   6/27/2018    Silvia Braga    MRN: [id number]           Patient Information     Date Of Birth          1990        Visit Information        Provider Department      6/27/2018 10:30 AM Sheryl Morris MD [facility]        Today's Diagnoses     Encounter for IUD insertion    -  1      Care Instructions    What Mirena Users May Expect    What to watch for right after Mirena is placed  Some women may experience uterine cramps, bleeding, and/or dizziness during and right after Mirena is placed. To help minimize the cramps, you may taken ibuprofen 600 mg with food prior to your appointment. These symptoms should improve over the next 24 hours.  Mild cramping may be present for a few days after your placement  As a follow up, you should check your strings on 4 weeks or visit your clinic once in the first 4 to 12 weeks after Mirena is placed to make sure it is in the right position. After that, Mirena can be checked once a year as part of your routine exam.    Please use a back-up method (abstinence or condoms) for 5 days after placement.    Your periods may change  For the first 3 to 6 months, your monthly period may become irregular. You may also have frequent spotting or light bleeding. A few women have heavy bleeding during this time. After your body adjusts, the number of bleeding days is likely to decrease (but may remain irregular), and you may even find that your periods stop altogether for as long as Mirena is in place. Around the end of the third month of use, you may see up to a 75% reduction in the amount of menstrual bleeding. By one year, about 1 out of 5 users may hay have no period at all. At the end of two years, 70% have little or no bleeding. Your periods will return rapidly once Mirena is removed.     Mirena Strings  You may check your own Mirena strings by inserting a finger into the vagina and feeling the strings as they exit the cervix.   The strings will initially feel firm, like fishing line, but will soften over a few weeks.  After the strings have softened, you or your partner should not be able to feel the strings during intercourse.  If you can feel the IUD, see your healthcare provider to have the position confirmed.  You may use tampons with Mirena in place.    Mirena does not protect against HIV or STDs.  Mirena does not prevent the formation of ovarian cysts.  Mirena does not typically reduce acne or cause weight gain or mood changes.    Please call [facility] at [phone] if you have questions or concerns.    For more information:  [email or web address]            Follow-ups after your visit        Your next 10 appointments already scheduled     Jul 24, 2018  3:00 PM CDT   [facility]    [address] [place] [id number] [phone]            Jul 24, 2018  3:00 PM CDT   [facility] Visit with [facility]   [facility]    [address] [place] [id number] [phone]           Bring a current list of meds and any records pertaining to this visit. For Physicals, please bring immunization records and any forms needing to be filled out. Please arrive 10 minutes early to complete paperwork.              Who to contact     If you have questions or need follow up information about today's clinic visit or your schedule please contact [facility] directly at [phone].  Normal or non-critical lab and imaging results will be communicated to you by MyChart, letter or phone within 4 business days after the clinic has received the results. If you do not hear from us within 7 days, please contact the clinic through MyChart or phone. If you have a critical or abnormal lab result, we will notify you by phone as soon as  "possible.  Submit refill requests through [employer] or call your pharmacy and they will forward the refill request to us. Please allow 3 business days for your refill to be completed.          Additional Information About Your Visit        Care EveryWhere ID     This is your Care EveryWhere ID. This could be used by other organizations to access your [facility] medical records  [id number]        Your Vitals Were     Pulse Temperature Height Last Period Pulse Oximetry Breastfeeding?    97 99.2  F (37.3  C) (Oral) 5' 11\" (1.803 m) 06/04/2018 100% No    BMI (Body Mass Index)                   20.04 kg/m2            Blood Pressure from Last 3 Encounters:   06/27/18 128/90   06/18/18 128/80   04/27/18 128/78    Weight from Last 3 Encounters:   06/27/18 143 lb 11.2 oz (65.2 kg)   06/18/18 144 lb (65.3 kg)   04/27/18 140 lb (63.5 kg)              We Performed the Following     Beta HCG qual IFA urine          Today's Medication Changes          These changes are accurate as of 6/27/18 10:56 AM.  If you have any questions, ask your nurse or doctor.               Stop taking these medicines if you haven't already. Please contact your care team if you have questions.     meclizine 25 MG tablet   Commonly known as:  ANTIVERT   Stopped by:  Sheryl Morris MD           SUMAtriptan 25 MG tablet   Commonly known as:  IMITREX   Stopped by:  Sheryl Morris MD                    Primary Care Provider Office Phone # Fax #    Christine Rianna Montesinos [phone]       [facility] [address]        Equal Access to Services     [facility]: Hadii iam zhou hadashbharath Sotracy, waaxda luqadaha, qaybta kaalmada michelle, desiree adorno. So [facility] [phone].    ATENCIÓN: Si habla español, tiene a moon disposición servicios gratuitos de asistencia lingüística. Llame al [phone].    We comply with applicable federal civil rights laws and [place] laws. We do " not discriminate on the basis of race, color, national origin, age, disability, sex, sexual orientation, or gender identity.            Thank you!     Thank you for choosing [facility]  for your care. Our goal is always to provide you with excellent care. Hearing back from our patients is one way we can continue to improve our services. Please take a few minutes to complete the written survey that you may receive in the mail after your visit with us. Thank you!             Your Updated Medication List - Protect others around you: Learn how to safely use, store and throw away your medicines at www.disposemymeds.org.          This list is accurate as of 6/27/18 10:56 AM.  Always use your most recent med list.                   Brand Name Dispense Instructions for use Diagnosis    norgestimate-ethinyl estradiol 0.25-35 MG-MCG per tablet    SPRINTEC 28    84 tablet    Take 1 tablet by mouth daily    Dysmenorrhea       valACYclovir 500 MG tablet    VALTREX    90 tablet    Take 1 tablet (500 mg) by mouth daily    Herpes simplex vulvovaginitis

## 2018-06-27 NOTE — PROGRESS NOTES
"Chief Complaint   Patient presents with     Contraception     New Patient       Initial BP (!) 132/91 (BP Location: Right arm, Patient Position: Sitting, Cuff Size: Adult Regular)  Pulse 97  Temp 99.2  F (37.3  C) (Oral)  Ht 5' 11\" (1.803 m)  Wt 143 lb 11.2 oz (65.2 kg)  LMP 2018  SpO2 100%  Breastfeeding? No  BMI 20.04 kg/m2 Estimated body mass index is 20.04 kg/(m^2) as calculated from the following:    Height as of this encounter: 5' 11\" (1.803 m).    Weight as of this encounter: 143 lb 11.2 oz (65.2 kg).  BP completed using cuff size: regular        The following HM Due: NONE      The following patient reported/Care Every where data was sent to:  P ABSTRACT QUALITY INITIATIVES [[id number]]  n/a      n/a and patient has appointment for today              "

## 2018-07-24 ENCOUNTER — OFFICE VISIT (OUTPATIENT)
Dept: OBGYN | Facility: CLINIC | Age: 28
End: 2018-07-24
Payer: COMMERCIAL

## 2018-07-24 ENCOUNTER — APPOINTMENT (OUTPATIENT)
Dept: FAMILY MEDICINE | Facility: CLINIC | Age: 28
End: 2018-07-24
Payer: COMMERCIAL

## 2018-07-24 VITALS
WEIGHT: 145 LBS | DIASTOLIC BLOOD PRESSURE: 74 MMHG | BODY MASS INDEX: 20.3 KG/M2 | TEMPERATURE: 98.4 F | SYSTOLIC BLOOD PRESSURE: 120 MMHG | HEIGHT: 71 IN

## 2018-07-24 DIAGNOSIS — R87.619 ABNORMAL CERVICAL PAPANICOLAOU SMEAR, UNSPECIFIED ABNORMAL PAP FINDING: Primary | ICD-10-CM

## 2018-07-24 LAB — BETA HCG QUAL IFA URINE: NEGATIVE

## 2018-07-24 PROCEDURE — 88305 TISSUE EXAM BY PATHOLOGIST: CPT | Performed by: OBSTETRICS & GYNECOLOGY

## 2018-07-24 PROCEDURE — 57454 BX/CURETT OF CERVIX W/SCOPE: CPT | Performed by: OBSTETRICS & GYNECOLOGY

## 2018-07-24 PROCEDURE — 84703 CHORIONIC GONADOTROPIN ASSAY: CPT | Performed by: OBSTETRICS & GYNECOLOGY

## 2018-07-24 NOTE — PROGRESS NOTES
Silvia Braga is a 28 year old female  who presents for repeat colposcopy, referred by Dr. Barber. Pap smear on 18 showed: normal and with high risk HPV present: not 16/18. The prior pap showed normal and with high risk HPV present: not 16/18.       No LMP recorded.  UPT today is negative  Patient does not smoke  Type of contraception: IUD  Age at first sexual intercourse: 17  Number of sexual partners (lifetime): <6  Past GYN history: HPV and HSV  Prior cervical/vaginal disease: Normal exam without visible pathology.  Prior cervical treatment: no treatment.      PROCEDURE:      Before the procedure, it was ensured that the patient was educated regarding the nature of her findings to date, the implications, and what was to be done. She has been made aware of the role of HPV, the natural history of infection, ways to minimize her future risk, the effect of HPV on the cervix, and treatment options available should they be indicated. The details of the colposcopic procedure were reviewed. All questions were answered before proceeding, and informed consent was therefore obtained.      Speculum placed in vagina and excellent visualization of cervix acheived, cervix swabbed x 3 with acetic acid solution.      FINDINGS:  Cervix: acetowhitening noted 1:00, 9:00. Biopsies taken.   IUD strings seen at os.  SCJ seen?: yes   ECC done?: Yes   Satisfactory examination?: yes      ASSESSMENT: HPV related changes and JONAS 1.  PLAN: specimens labelled and sent to Pathology, will base further treatment on Pathology findings, post biopsy instructions given to patient, call to discuss Pathology results and anticipate hpv/pap in one year      Sheryl Morris MD

## 2018-07-24 NOTE — MR AVS SNAPSHOT
After Visit Summary   7/24/2018    Silvia Braga    MRN: [id number]           Patient Information     Date Of Birth          1990        Visit Information        Provider Department      7/24/2018 3:00 PM Sheryl Morris MD [facility]        Today's Diagnoses     Abnormal cervical Papanicolaou smear, unspecified abnormal pap finding    -  1      Care Instructions    Colposcopy Post-Procedure Patient Instructions    You may experience any of the following for a couple of days following colposcopy:    Mild cramping    Vaginal bleeding     Vaginal discharge that looks black and clumpy    Please call your healthcare provider if you have any of the following symptoms:    Fever--Temperature greater than 100 degrees    Cramping after 48 hours    Bleeding heavier than a normal period in the first 24-48 hours    If you are bleeding and soaking more than one pad an hour    Or any other worrisome problems.    We recommend that:    You use pads, not tampons for the bleeding.    You may resume sexual activity in 2-3 days, or after bleeding stops.    You may use Tylenol or ibuprofen (Motrin or Advil) for any discomfort.      [facility] : [phone]              Follow-ups after your visit        Who to contact     If you have questions or need follow up information about today's clinic visit or your schedule please contact [facility] directly at [phone].  Normal or non-critical lab and imaging results will be communicated to you by MyChart, letter or phone within 4 business days after the clinic has received the results. If you do not hear from us within 7 days, please contact the clinic through MyChart or phone. If you have a critical or abnormal lab result, we will notify you by phone as soon as possible.  Submit refill requests through [employer] or call your pharmacy and they will forward the refill request to us. Please allow 3  "business days for your refill to be completed.          Additional Information About Your Visit        Care EveryWhere ID     This is your Care EveryWhere ID. This could be used by other organizations to access your [facility] medical records  [id number]        Your Vitals Were     Temperature Height Last Period BMI (Body Mass Index)          98.4  F (36.9  C) (Oral) 5' 11\" (1.803 m) 06/13/2018 (Approximate) 20.22 kg/m2         Blood Pressure from Last 3 Encounters:   07/24/18 120/74   06/27/18 128/90   06/18/18 128/80    Weight from Last 3 Encounters:   07/24/18 145 lb (65.8 kg)   06/27/18 143 lb 11.2 oz (65.2 kg)   06/18/18 144 lb (65.3 kg)              We Performed the Following     Beta HCG qual IFA urine          Today's Medication Changes          These changes are accurate as of 7/24/18  3:51 PM.  If you have any questions, ask your nurse or doctor.               Stop taking these medicines if you haven't already. Please contact your care team if you have questions.     norgestimate-ethinyl estradiol 0.25-35 MG-MCG per tablet   Commonly known as:  SPRINTEC 28   Stopped by:  Sheryl Morris MD                    Primary Care Provider Office Phone # Fax #    Christine Rianna Montesinos [phone]       [address] [facility] [address]        Equal Access to Services     [facility]: Hadii aad ku hadasho Sotracy, waaxda luqadaha, qaybta kaalmada adejamyada, desiree adorno. So [facility] [phone].    ATENCIÓN: Si habla español, tiene a moon disposición servicios gratuitos de asistencia lingüística. Charli florence [phone].    We comply with applicable federal civil rights laws and [place] laws. We do not discriminate on the basis of race, color, national origin, age, disability, sex, sexual orientation, or gender identity.            Thank you!     Thank you for choosing [facility]  for your care. Our goal is always to provide you with " excellent care. Hearing back from our patients is one way we can continue to improve our services. Please take a few minutes to complete the written survey that you may receive in the mail after your visit with us. Thank you!             Your Updated Medication List - Protect others around you: Learn how to safely use, store and throw away your medicines at www.disposemymeds.org.          This list is accurate as of 7/24/18  3:51 PM.  Always use your most recent med list.                   Brand Name Dispense Instructions for use Diagnosis    levonorgestrel 20 MCG/24HR IUD    MIRENA    1 each    1 each (20 mcg) by Intrauterine route once for 1 dose    Encounter for IUD insertion       valACYclovir 500 MG tablet    VALTREX    90 tablet    Take 1 tablet (500 mg) by mouth daily    Herpes simplex vulvovaginitis

## 2018-07-24 NOTE — PATIENT INSTRUCTIONS

## 2018-07-30 LAB — COPATH REPORT: NORMAL

## 2018-10-10 DIAGNOSIS — A60.04 HERPES SIMPLEX VULVOVAGINITIS: ICD-10-CM

## 2018-10-10 RX ORDER — VALACYCLOVIR HYDROCHLORIDE 500 MG/1
TABLET, FILM COATED ORAL
Qty: 30 TABLET | Refills: 0 | Status: SHIPPED | OUTPATIENT
Start: 2018-10-10 | End: 2018-10-31

## 2018-10-10 NOTE — TELEPHONE ENCOUNTER
Patient/family was instructed to return call to [facility] RN directly on the RN Call back line at [phone].     Mallika Brown RN

## 2018-10-10 NOTE — TELEPHONE ENCOUNTER
Spoke with patient, who states she moved back to [place] about a month ago, plans to resume seeing the provider that she used to see there about two years ago.  Are you willing to provide a 30-day mina refill until she can get in with her new provider and have them take over on prescribing?    Mallika Brown RN

## 2018-10-10 NOTE — TELEPHONE ENCOUNTER
Patient/family was instructed to return call to [facility] RN directly on the RN Call back line at [phone].     Mallika Brown RN

## 2018-10-10 NOTE — TELEPHONE ENCOUNTER
"Requested Prescriptions   Pending Prescriptions Disp Refills     valACYclovir (VALTREX) 500 MG tablet [Pharmacy Med Name: VALACYCLOVIR  MG TABLET]  Last Written Prescription Date:  4/11/2018  Last Fill Quantity: 90 tabs,  # refills: 1   Last office visit: No previous visit found with prescribing provider:  Sunil   Future Office Visit:     90 tablet 1     Sig: TAKE 1 TABLET BY MOUTH EVERY DAY    Antivirals for Herpes Protocol Passed    10/10/2018  1:45 AM       Passed - Patient is age 12 or older       Passed - Recent (12 mo) or future (30 days) visit within the authorizing provider's specialty    Patient had office visit in the last 12 months or has a visit in the next 30 days with authorizing provider or within the authorizing provider's specialty.  See \"Patient Info\" tab in inbasket, or \"Choose Columns\" in Meds & Orders section of the refill encounter.           Passed - Normal serum creatinine on file in past 12 months    Recent Labs   Lab Test  04/27/18   1346   CR  0.71               "

## 2018-10-10 NOTE — TELEPHONE ENCOUNTER
Based on my note, she does need follow up.  Follow up could be by phone and with lab OR in person and with lab.

## 2018-10-10 NOTE — TELEPHONE ENCOUNTER
"Route refill request for valacyclovir to PCP.  This passes protocol, but per virtual visit with you on 4/11/18, plan was for a 6 month time period and to check kidney function at some point, which doesn't appear was done.  Would you like her to follow-up in clinic with either you or Dr. Barber?  Attempted to reach patient via phone to obtain update, but no answer.    Virtual visit note from 4/11/18:    \"Advice/instructions given to patient/guardian including prescriptions, follow up appointment or orders for diagnostic testing:   Desires to try daily suppression.  We agreed to do this for a 6 month time period.  I discussed with the patient risks and benefits of the new medications prescribed including potential side effects.  The patient had opportunity to ask questions and is comfortable with and interested in medications as prescribed.     Although she will evaluate cost when she goes to her pharmacy to make sure she can afford daily therapy.  We will monitor kidney function at her Preventive Visit in 2-3 months while she is on daily suppressive therapy.\"      Mallika Brown RN    "

## 2018-10-10 NOTE — TELEPHONE ENCOUNTER
Patient returned call and left VM on RN line.  Please call back at [phone]    Delfin Gold RN

## 2018-10-31 ENCOUNTER — VIRTUAL VISIT (OUTPATIENT)
Dept: FAMILY MEDICINE | Facility: CLINIC | Age: 28
End: 2018-10-31
Payer: COMMERCIAL

## 2018-10-31 ENCOUNTER — TELEPHONE (OUTPATIENT)
Dept: FAMILY MEDICINE | Facility: CLINIC | Age: 28
End: 2018-10-31

## 2018-10-31 DIAGNOSIS — A60.04 HERPES SIMPLEX VULVOVAGINITIS: ICD-10-CM

## 2018-10-31 RX ORDER — VALACYCLOVIR HYDROCHLORIDE 500 MG/1
500 TABLET, FILM COATED ORAL DAILY
Qty: 90 TABLET | Refills: 3 | Status: SHIPPED | OUTPATIENT
Start: 2018-10-31

## 2018-10-31 NOTE — TELEPHONE ENCOUNTER
Patient reports she is still out of town, is wondering if there is a way to get around having a visit so that she can continue to get her Valtrex. Last refill note from 10/10/18 reviewed, and provider stated okay for telephone visit.  RN assisted with scheduling for this afternoon.    Mallika Brown RN

## 2018-10-31 NOTE — MR AVS SNAPSHOT
After Visit Summary   10/31/2018    Silvia Braga    MRN: [id number]           Patient Information     Date Of Birth          1990        Visit Information        Provider Department      10/31/2018 4:00 PM Christine Montesinos MD [facility]        Today's Diagnoses     Herpes simplex vulvovaginitis           Follow-ups after your visit        Who to contact     If you have questions or need follow up information about today's clinic visit or your schedule please contact [facility] directly at [phone].  Normal or non-critical lab and imaging results will be communicated to you by MyChart, letter or phone within 4 business days after the clinic has received the results. If you do not hear from us within 7 days, please contact the clinic through MyChart or phone. If you have a critical or abnormal lab result, we will notify you by phone as soon as possible.  Submit refill requests through [employer] or call your pharmacy and they will forward the refill request to us. Please allow 3 business days for your refill to be completed.          Additional Information About Your Visit        Care EveryWhere ID     This is your Care EveryWhere ID. This could be used by other organizations to access your [place] medical records  [id number]         Blood Pressure from Last 3 Encounters:   07/24/18 120/74   06/27/18 128/90   06/18/18 128/80    Weight from Last 3 Encounters:   07/24/18 145 lb (65.8 kg)   06/27/18 143 lb 11.2 oz (65.2 kg)   06/18/18 144 lb (65.3 kg)              Today, you had the following     No orders found for display         Today's Medication Changes          These changes are accurate as of 10/31/18  5:10 PM.  If you have any questions, ask your nurse or doctor.               These medicines have changed or have updated prescriptions.        Dose/Directions    valACYclovir 500 MG tablet   Commonly known as:  VALTREX   This may have changed:   See the new instructions.   Used for:  Herpes simplex vulvovaginitis   Changed by:  Christine Montesinos MD        Dose:  500 mg   Take 1 tablet (500 mg) by mouth daily   Quantity:  90 tablet   Refills:  3            Where to get your medicines      These medications were sent to [facility]/pharmacy #[id number] - [place], [place] - [address]  [address] [facility] [address]     Phone:  [phone]     valACYclovir 500 MG tablet                Primary Care Provider    Provider Outside       No address on file        Equal Access to Services     [facility]: Hadii iam zhou hadasho Soomaali, waaxda luqadaha, qaybta kaalmada adelalita, desiree owusu . So [facility] [phone].    ATENCIÓN: Si habla español, tiene a moon disposición servicios gratuitos de asistencia lingüística. [facility] [phone].    We comply with applicable federal civil rights laws and [place] laws. We do not discriminate on the basis of race, color, national origin, age, disability, sex, sexual orientation, or gender identity.            Thank you!     Thank you for choosing [facility]  for your care. Our goal is always to provide you with excellent care. Hearing back from our patients is one way we can continue to improve our services. Please take a few minutes to complete the written survey that you may receive in the mail after your visit with us. Thank you!             Your Updated Medication List - Protect others around you: Learn how to safely use, store and throw away your medicines at www.disposemymeds.org.          This list is accurate as of 10/31/18  5:10 PM.  Always use your most recent med list.                   Brand Name Dispense Instructions for use Diagnosis    levonorgestrel 20 MCG/24HR IUD    MIRENA    1 each    1 each (20 mcg) by Intrauterine route once for 1 dose    Encounter for IUD insertion       valACYclovir 500 MG tablet    VALTREX    90 tablet    Take 1 tablet (500 mg) by mouth  daily    Herpes simplex vulvovaginitis

## 2018-10-31 NOTE — TELEPHONE ENCOUNTER
Reason for Call:  Other - Medication Question: valACYclovir (VALTREX) 500 MG tablet    Detailed comments: Patient stated she has some questions about her medication. She is requesting a call back from a nurse.    Phone Number Patient can be reached at: Home number on file [phone] (home)    Best Time: Anytime    Can we leave a detailed message on this number? YES    Call taken on 10/31/2018 at 8:38 AM by Isabella Marin

## 2018-10-31 NOTE — PROGRESS NOTES
"Silvia Braga is a 28 year old female who is being evaluated via a telephone visit.      The patient has been notified of following (by SHAUNA Gregory MA     \"We have found that certain health care needs can be provided without the need for a physical exam.  This service lets us provide the care you need with a short phone conversation.  If a prescription is necessary we can send it directly to your pharmacy.  If lab work is needed we can place an order for that and you can then stop by our lab to have the test done at a later time.    This telephone visit will be conducted via 3 way call with the you (the patient) , the physician/provider, and a me all on the line at the same time.  This allows your physician/provider to have the phone conversation with you while I will be taking notes for your medical record.  We will have full access to your [place] medical record during this entire phone call.    Since this is like an office visit,  will bill your insurance company for this service.  Please check with your medical insurance if this type of telephone/virtual is covered . You may be responsible for the cost of this service if insurance coverage is denied.  The typical cost is $30 (10min), $59(11-20min) and $85 (21-30min).     If during the course of the call the physician/provider feels a telephone visit is not appropriate, you will not be charged for this service\"    Consent has been obtained for this service by care team member: yes.  See the scanned image in the medical record.    Soheila has moved to [place].  She is doing well on the valtrex - and has had no outbreaks.  Had her pap test this year.  And had BMP checked for other reasons.    Start 5:02  Stop 5:06  ===================================================    I have reviewed the note as documented above.  This accurately captures the substance of my conversation with the patient,        Assessment/Plan:  (A60.04) Herpes simplex " vulvovaginitis  Comment: doing well on suppressive therapy  Plan: valACYclovir (VALTREX) 500 MG tablet        Continue current medication    Will seek a new provider in [place] in summer when she is due for her preventive visit and Pap

## 2019-01-02 ENCOUNTER — TELEPHONE (OUTPATIENT)
Dept: FAMILY MEDICINE | Facility: CLINIC | Age: 29
End: 2019-01-02

## 2019-01-02 NOTE — TELEPHONE ENCOUNTER
"Route to Dr. Flood in Dr. Morris's absence. Please advise.    Patient had Mirena IUD placed back in June by Dr. Morris and follow-up/placement checked in July. Patient is wondering how long it takes for periods to \"regulate\". She reports menses anywhere from 2-3 weeks apart, usually lasting a few days in duration. She also reports cramping, which she had previously, but had anticipated this would improve with the IUD, and it hasn't. She denies heavy bleeding, ongoing/severe abdominal pain, fever.    RN instructed that it can sometimes take several months for periods to normalize and may experience abnormal bleeding during this time.  Will route to provider for any further recommendations.    Mallika Brown RN    "

## 2019-01-02 NOTE — TELEPHONE ENCOUNTER
Many women with Mirena do have improvement in bleeding and cramping, although not everyone.  I generally tell patients it can take up to 6 months.  We can always check an ultrasound to ensure position of IUD is correct, if she would like.  I'm happy to place the order for the ultrasound, just let me know.    Thanks,  Candy Flood MD

## 2019-01-02 NOTE — TELEPHONE ENCOUNTER
Reason for Call:  Other - IUD Questions    Detailed comments: Patient called and stated she has some questions about her IUD. Please call back to discuss.     Phone Number Patient can be reached at: Home number on file [phone] (home)    Best Time: anytime    Can we leave a detailed message on this number? YES    Call taken on 1/2/2019 at 1:46 PM by Isabella Marin

## 2019-01-04 NOTE — TELEPHONE ENCOUNTER
Left detailed VM with information from provider below and instructed she can call OB/GYN line or our nurse line at [phone] if interested in an ultrasound.  Will leave encounter open for a response.    Mallika Brown RN

## 2019-01-07 NOTE — TELEPHONE ENCOUNTER
Route to provider to advise please.      Patient reports she lives in [place] now will be in the [facility] in a few weeks and can get an ultrasound done then if needed.  She states she used to take the pill for bleeding, cramping and breakouts, but was taken off of this due to high blood pressure.  She states since having the IUD, she is bleeding more again, having more cramps and breakouts.  She is currently on her 9th day of bleeding this cycle.  She is wondering if there are any other options for pills, etc that would help with her sx or if she needs an ultrasound. RN instructed that she may need some type of visit to discuss further.     Mallika Brown RN

## 2019-01-08 NOTE — TELEPHONE ENCOUNTER
Patient/family was instructed to return call to [facility] RN directly on the RN Call back line at [phone].     Mallika Brown RN

## 2019-01-08 NOTE — TELEPHONE ENCOUNTER
Unfortunately, the type of birth control pills that help acne are the kind that we can't use when someone has high blood pressure.  The Mirena IUD does typically help bleeding, but can take some time for that to occur.  It does sound like the patient needs to schedule an appointment to discuss our concerns.  I'd be happy to see her, however she may benefit from establishing care with an ob/gyn in [place] if she lives there now.    Candy Flood MD

## 2019-01-08 NOTE — TELEPHONE ENCOUNTER
Patient returns call to RN VM. She says it's okay to leave a detailed VM. She can be reached at [phone].    Isaac Macias RN

## 2019-01-08 NOTE — TELEPHONE ENCOUNTER
Information from provider below relayed to patient with understanding voiced. She states she may give it a bit more time and establish with someone in the [place] area to discuss this further and come up with a plan if needed.  Will close encounter at this time.    Mallika Brown RN

## 2019-07-17 ENCOUNTER — MYC MEDICAL ADVICE (OUTPATIENT)
Dept: FAMILY MEDICINE | Facility: CLINIC | Age: 29
End: 2019-07-17

## 2019-08-27 ENCOUNTER — TELEPHONE (OUTPATIENT)
Dept: OBGYN | Facility: CLINIC | Age: 29
End: 2019-08-27

## 2019-08-27 NOTE — TELEPHONE ENCOUNTER
Pt is past due for Pap follow up  Reminder letter has been sent  LMTC her clinic with any questions or to schedule    Aminata Lombardi,   Pap Tracking

## 2020-03-10 ENCOUNTER — HEALTH MAINTENANCE LETTER (OUTPATIENT)
Age: 30
End: 2020-03-10

## 2020-12-27 ENCOUNTER — HEALTH MAINTENANCE LETTER (OUTPATIENT)
Age: 30
End: 2020-12-27

## 2021-04-24 ENCOUNTER — HEALTH MAINTENANCE LETTER (OUTPATIENT)
Age: 31
End: 2021-04-24

## 2021-10-09 ENCOUNTER — HEALTH MAINTENANCE LETTER (OUTPATIENT)
Age: 31
End: 2021-10-09

## 2022-05-21 ENCOUNTER — HEALTH MAINTENANCE LETTER (OUTPATIENT)
Age: 32
End: 2022-05-21

## 2022-09-11 ENCOUNTER — HEALTH MAINTENANCE LETTER (OUTPATIENT)
Age: 32
End: 2022-09-11

## 2023-06-03 ENCOUNTER — HEALTH MAINTENANCE LETTER (OUTPATIENT)
Age: 33
End: 2023-06-03